# Patient Record
Sex: FEMALE | Race: WHITE | NOT HISPANIC OR LATINO
[De-identification: names, ages, dates, MRNs, and addresses within clinical notes are randomized per-mention and may not be internally consistent; named-entity substitution may affect disease eponyms.]

---

## 2017-03-07 ENCOUNTER — MEDICATION RENEWAL (OUTPATIENT)
Age: 72
End: 2017-03-07

## 2017-05-01 ENCOUNTER — APPOINTMENT (OUTPATIENT)
Dept: PHYSICAL MEDICINE AND REHAB | Facility: CLINIC | Age: 72
End: 2017-05-01

## 2017-05-01 ENCOUNTER — OUTPATIENT (OUTPATIENT)
Dept: OUTPATIENT SERVICES | Facility: HOSPITAL | Age: 72
LOS: 1 days | End: 2017-05-01
Payer: COMMERCIAL

## 2017-05-01 DIAGNOSIS — R26.89 OTHER ABNORMALITIES OF GAIT AND MOBILITY: ICD-10-CM

## 2017-05-01 DIAGNOSIS — G83.4 CAUDA EQUINA SYNDROME: ICD-10-CM

## 2017-05-01 DIAGNOSIS — Z98.89 OTHER SPECIFIED POSTPROCEDURAL STATES: Chronic | ICD-10-CM

## 2017-05-01 DIAGNOSIS — M47.9 SPONDYLOSIS, UNSPECIFIED: Chronic | ICD-10-CM

## 2017-05-01 DIAGNOSIS — T85.118A: Chronic | ICD-10-CM

## 2017-05-01 DIAGNOSIS — K38.9 DISEASE OF APPENDIX, UNSPECIFIED: Chronic | ICD-10-CM

## 2017-05-01 DIAGNOSIS — M25.569 PAIN IN UNSPECIFIED KNEE: Chronic | ICD-10-CM

## 2017-05-01 DIAGNOSIS — Z51.89 ENCOUNTER FOR OTHER SPECIFIED AFTERCARE: ICD-10-CM

## 2017-05-01 DIAGNOSIS — M25.561 PAIN IN RIGHT KNEE: Chronic | ICD-10-CM

## 2017-05-01 DIAGNOSIS — N83.20 UNSPECIFIED OVARIAN CYSTS: Chronic | ICD-10-CM

## 2017-05-01 RX ORDER — AZITHROMYCIN 250 MG/1
250 TABLET, FILM COATED ORAL
Qty: 6 | Refills: 0 | Status: COMPLETED | COMMUNITY
Start: 2017-01-04

## 2017-06-19 ENCOUNTER — MEDICATION RENEWAL (OUTPATIENT)
Age: 72
End: 2017-06-19

## 2017-06-30 ENCOUNTER — APPOINTMENT (OUTPATIENT)
Dept: ORTHOPEDIC SURGERY | Facility: CLINIC | Age: 72
End: 2017-06-30

## 2017-07-03 ENCOUNTER — APPOINTMENT (OUTPATIENT)
Dept: ORTHOPEDIC SURGERY | Facility: CLINIC | Age: 72
End: 2017-07-03

## 2017-07-03 VITALS
DIASTOLIC BLOOD PRESSURE: 79 MMHG | SYSTOLIC BLOOD PRESSURE: 128 MMHG | HEIGHT: 65 IN | HEART RATE: 107 BPM | BODY MASS INDEX: 24.99 KG/M2 | WEIGHT: 150 LBS

## 2017-07-03 DIAGNOSIS — M65.342 TRIGGER FINGER, LEFT RING FINGER: ICD-10-CM

## 2017-07-03 DIAGNOSIS — M65.332 TRIGGER FINGER, LEFT MIDDLE FINGER: ICD-10-CM

## 2017-07-05 ENCOUNTER — APPOINTMENT (OUTPATIENT)
Dept: VASCULAR SURGERY | Facility: CLINIC | Age: 72
End: 2017-07-05

## 2017-07-05 VITALS
SYSTOLIC BLOOD PRESSURE: 174 MMHG | HEART RATE: 98 BPM | RESPIRATION RATE: 15 BRPM | OXYGEN SATURATION: 100 % | WEIGHT: 150 LBS | HEIGHT: 65 IN | TEMPERATURE: 98.1 F | BODY MASS INDEX: 24.99 KG/M2 | DIASTOLIC BLOOD PRESSURE: 78 MMHG

## 2017-07-05 DIAGNOSIS — R09.89 OTHER SPECIFIED SYMPTOMS AND SIGNS INVOLVING THE CIRCULATORY AND RESPIRATORY SYSTEMS: ICD-10-CM

## 2017-08-18 ENCOUNTER — APPOINTMENT (OUTPATIENT)
Dept: PHYSICAL MEDICINE AND REHAB | Facility: CLINIC | Age: 72
End: 2017-08-18
Payer: OTHER MISCELLANEOUS

## 2017-08-18 VITALS
WEIGHT: 150 LBS | DIASTOLIC BLOOD PRESSURE: 89 MMHG | HEIGHT: 65 IN | BODY MASS INDEX: 24.99 KG/M2 | SYSTOLIC BLOOD PRESSURE: 146 MMHG | HEART RATE: 97 BPM | OXYGEN SATURATION: 99 %

## 2017-08-18 PROCEDURE — 99213 OFFICE O/P EST LOW 20 MIN: CPT

## 2017-10-25 PROCEDURE — 97112 NEUROMUSCULAR REEDUCATION: CPT

## 2017-10-25 PROCEDURE — 97116 GAIT TRAINING THERAPY: CPT

## 2017-10-25 PROCEDURE — 97530 THERAPEUTIC ACTIVITIES: CPT

## 2017-10-25 PROCEDURE — 97110 THERAPEUTIC EXERCISES: CPT

## 2017-10-25 PROCEDURE — 97163 PT EVAL HIGH COMPLEX 45 MIN: CPT

## 2017-11-01 ENCOUNTER — OUTPATIENT (OUTPATIENT)
Dept: OUTPATIENT SERVICES | Facility: HOSPITAL | Age: 72
LOS: 1 days | End: 2017-11-01
Payer: COMMERCIAL

## 2017-11-01 DIAGNOSIS — Z98.89 OTHER SPECIFIED POSTPROCEDURAL STATES: Chronic | ICD-10-CM

## 2017-11-01 DIAGNOSIS — M47.9 SPONDYLOSIS, UNSPECIFIED: Chronic | ICD-10-CM

## 2017-11-01 DIAGNOSIS — K38.9 DISEASE OF APPENDIX, UNSPECIFIED: Chronic | ICD-10-CM

## 2017-11-01 DIAGNOSIS — M25.569 PAIN IN UNSPECIFIED KNEE: Chronic | ICD-10-CM

## 2017-11-01 DIAGNOSIS — M25.561 PAIN IN RIGHT KNEE: Chronic | ICD-10-CM

## 2017-11-01 DIAGNOSIS — T85.118A: Chronic | ICD-10-CM

## 2017-11-01 DIAGNOSIS — N83.20 UNSPECIFIED OVARIAN CYSTS: Chronic | ICD-10-CM

## 2017-11-09 ENCOUNTER — APPOINTMENT (OUTPATIENT)
Dept: PHYSICAL MEDICINE AND REHAB | Facility: CLINIC | Age: 72
End: 2017-11-09
Payer: OTHER MISCELLANEOUS

## 2017-11-09 VITALS
SYSTOLIC BLOOD PRESSURE: 140 MMHG | WEIGHT: 150 LBS | HEIGHT: 65 IN | OXYGEN SATURATION: 100 % | DIASTOLIC BLOOD PRESSURE: 83 MMHG | BODY MASS INDEX: 24.99 KG/M2 | HEART RATE: 100 BPM

## 2017-11-09 PROCEDURE — 99213 OFFICE O/P EST LOW 20 MIN: CPT

## 2017-11-17 PROCEDURE — 97116 GAIT TRAINING THERAPY: CPT

## 2017-11-17 PROCEDURE — 97112 NEUROMUSCULAR REEDUCATION: CPT

## 2017-11-17 PROCEDURE — 97110 THERAPEUTIC EXERCISES: CPT

## 2017-11-20 ENCOUNTER — MEDICATION RENEWAL (OUTPATIENT)
Age: 72
End: 2017-11-20

## 2017-11-20 ENCOUNTER — RX RENEWAL (OUTPATIENT)
Age: 72
End: 2017-11-20

## 2017-11-24 DIAGNOSIS — G83.4 CAUDA EQUINA SYNDROME: ICD-10-CM

## 2017-11-24 DIAGNOSIS — Z51.89 ENCOUNTER FOR OTHER SPECIFIED AFTERCARE: ICD-10-CM

## 2017-11-24 DIAGNOSIS — R26.89 OTHER ABNORMALITIES OF GAIT AND MOBILITY: ICD-10-CM

## 2018-03-06 ENCOUNTER — MEDICATION RENEWAL (OUTPATIENT)
Age: 73
End: 2018-03-06

## 2018-03-28 ENCOUNTER — APPOINTMENT (OUTPATIENT)
Dept: VASCULAR SURGERY | Facility: CLINIC | Age: 73
End: 2018-03-28

## 2018-05-01 ENCOUNTER — TRANSCRIPTION ENCOUNTER (OUTPATIENT)
Age: 73
End: 2018-05-01

## 2018-05-01 ENCOUNTER — INPATIENT (INPATIENT)
Facility: HOSPITAL | Age: 73
LOS: 3 days | Discharge: HOME | DRG: 481 | End: 2018-05-05
Attending: SURGERY | Admitting: SURGERY
Payer: MEDICARE

## 2018-05-01 VITALS
HEART RATE: 109 BPM | DIASTOLIC BLOOD PRESSURE: 67 MMHG | OXYGEN SATURATION: 97 % | SYSTOLIC BLOOD PRESSURE: 120 MMHG | TEMPERATURE: 99 F | HEIGHT: 65 IN | WEIGHT: 139.99 LBS | RESPIRATION RATE: 20 BRPM

## 2018-05-01 DIAGNOSIS — M25.569 PAIN IN UNSPECIFIED KNEE: Chronic | ICD-10-CM

## 2018-05-01 DIAGNOSIS — M25.561 PAIN IN RIGHT KNEE: Chronic | ICD-10-CM

## 2018-05-01 DIAGNOSIS — M47.9 SPONDYLOSIS, UNSPECIFIED: Chronic | ICD-10-CM

## 2018-05-01 DIAGNOSIS — Z98.89 OTHER SPECIFIED POSTPROCEDURAL STATES: Chronic | ICD-10-CM

## 2018-05-01 DIAGNOSIS — T85.118A: Chronic | ICD-10-CM

## 2018-05-01 DIAGNOSIS — N83.20 UNSPECIFIED OVARIAN CYSTS: Chronic | ICD-10-CM

## 2018-05-01 DIAGNOSIS — S72.91XA UNSPECIFIED FRACTURE OF RIGHT FEMUR, INITIAL ENCOUNTER FOR CLOSED FRACTURE: ICD-10-CM

## 2018-05-01 DIAGNOSIS — K38.9 DISEASE OF APPENDIX, UNSPECIFIED: Chronic | ICD-10-CM

## 2018-05-01 LAB
ALBUMIN SERPL ELPH-MCNC: 2.7 G/DL — LOW (ref 3.3–5.2)
ALP SERPL-CCNC: 87 U/L — SIGNIFICANT CHANGE UP (ref 40–120)
ALT FLD-CCNC: 20 U/L — SIGNIFICANT CHANGE UP
ANION GAP SERPL CALC-SCNC: 13 MMOL/L — SIGNIFICANT CHANGE UP (ref 5–17)
ANISOCYTOSIS BLD QL: SLIGHT — SIGNIFICANT CHANGE UP
AST SERPL-CCNC: 21 U/L — SIGNIFICANT CHANGE UP
BASOPHILS NFR BLD AUTO: 1 % — SIGNIFICANT CHANGE UP (ref 0–2)
BILIRUB SERPL-MCNC: 1.1 MG/DL — SIGNIFICANT CHANGE UP (ref 0.4–2)
BLD GP AB SCN SERPL QL: SIGNIFICANT CHANGE UP
BUN SERPL-MCNC: 19 MG/DL — SIGNIFICANT CHANGE UP (ref 8–20)
CALCIUM SERPL-MCNC: 8 MG/DL — LOW (ref 8.6–10.2)
CHLORIDE SERPL-SCNC: 106 MMOL/L — SIGNIFICANT CHANGE UP (ref 98–107)
CO2 SERPL-SCNC: 21 MMOL/L — LOW (ref 22–29)
CREAT SERPL-MCNC: 0.58 MG/DL — SIGNIFICANT CHANGE UP (ref 0.5–1.3)
EOSINOPHIL NFR BLD AUTO: 2 % — SIGNIFICANT CHANGE UP (ref 0–5)
GLUCOSE SERPL-MCNC: 120 MG/DL — HIGH (ref 70–115)
HCT VFR BLD CALC: 26.3 % — LOW (ref 37–47)
HGB BLD-MCNC: 8.6 G/DL — LOW (ref 12–16)
INR BLD: 1.28 RATIO — HIGH (ref 0.88–1.16)
LYMPHOCYTES # BLD AUTO: 31 % — SIGNIFICANT CHANGE UP (ref 20–55)
MACROCYTES BLD QL: SLIGHT — SIGNIFICANT CHANGE UP
MCHC RBC-ENTMCNC: 29.3 PG — SIGNIFICANT CHANGE UP (ref 27–31)
MCHC RBC-ENTMCNC: 32.7 G/DL — SIGNIFICANT CHANGE UP (ref 32–36)
MCV RBC AUTO: 89.5 FL — SIGNIFICANT CHANGE UP (ref 81–99)
MICROCYTES BLD QL: SLIGHT — SIGNIFICANT CHANGE UP
MONOCYTES NFR BLD AUTO: 8 % — SIGNIFICANT CHANGE UP (ref 3–10)
NEUTROPHILS NFR BLD AUTO: 58 % — SIGNIFICANT CHANGE UP (ref 37–73)
PLAT MORPH BLD: NORMAL — SIGNIFICANT CHANGE UP
PLATELET # BLD AUTO: 357 K/UL — SIGNIFICANT CHANGE UP (ref 150–400)
POLYCHROMASIA BLD QL SMEAR: SLIGHT — SIGNIFICANT CHANGE UP
POTASSIUM SERPL-MCNC: 3.4 MMOL/L — LOW (ref 3.5–5.3)
POTASSIUM SERPL-SCNC: 3.4 MMOL/L — LOW (ref 3.5–5.3)
PROT SERPL-MCNC: 5.2 G/DL — LOW (ref 6.6–8.7)
PROTHROM AB SERPL-ACNC: 14.1 SEC — HIGH (ref 9.8–12.7)
RBC # BLD: 2.94 M/UL — LOW (ref 4.4–5.2)
RBC # FLD: 13.5 % — SIGNIFICANT CHANGE UP (ref 11–15.6)
RBC BLD AUTO: ABNORMAL
SODIUM SERPL-SCNC: 140 MMOL/L — SIGNIFICANT CHANGE UP (ref 135–145)
TYPE + AB SCN PNL BLD: SIGNIFICANT CHANGE UP
WBC # BLD: 19.4 K/UL — HIGH (ref 4.8–10.8)
WBC # FLD AUTO: 19.4 K/UL — HIGH (ref 4.8–10.8)

## 2018-05-01 PROCEDURE — 93010 ELECTROCARDIOGRAM REPORT: CPT

## 2018-05-01 PROCEDURE — 99285 EMERGENCY DEPT VISIT HI MDM: CPT

## 2018-05-01 PROCEDURE — 73552 X-RAY EXAM OF FEMUR 2/>: CPT | Mod: 26,RT

## 2018-05-01 PROCEDURE — 71045 X-RAY EXAM CHEST 1 VIEW: CPT | Mod: 26

## 2018-05-01 PROCEDURE — 73502 X-RAY EXAM HIP UNI 2-3 VIEWS: CPT | Mod: 26,RT

## 2018-05-01 RX ORDER — SODIUM CHLORIDE 9 MG/ML
1000 INJECTION, SOLUTION INTRAVENOUS
Qty: 0 | Refills: 0 | Status: DISCONTINUED | OUTPATIENT
Start: 2018-05-01 | End: 2018-05-02

## 2018-05-01 RX ORDER — SODIUM CHLORIDE 9 MG/ML
3 INJECTION INTRAMUSCULAR; INTRAVENOUS; SUBCUTANEOUS EVERY 8 HOURS
Qty: 0 | Refills: 0 | Status: DISCONTINUED | OUTPATIENT
Start: 2018-05-01 | End: 2018-05-02

## 2018-05-01 RX ORDER — DOCUSATE SODIUM 100 MG
100 CAPSULE ORAL THREE TIMES A DAY
Qty: 0 | Refills: 0 | Status: DISCONTINUED | OUTPATIENT
Start: 2018-05-01 | End: 2018-05-02

## 2018-05-01 RX ORDER — ENOXAPARIN SODIUM 100 MG/ML
30 INJECTION SUBCUTANEOUS EVERY 12 HOURS
Qty: 0 | Refills: 0 | Status: DISCONTINUED | OUTPATIENT
Start: 2018-05-01 | End: 2018-05-02

## 2018-05-01 RX ORDER — SENNA PLUS 8.6 MG/1
2 TABLET ORAL AT BEDTIME
Qty: 0 | Refills: 0 | Status: DISCONTINUED | OUTPATIENT
Start: 2018-05-01 | End: 2018-05-02

## 2018-05-01 RX ORDER — ONDANSETRON 8 MG/1
4 TABLET, FILM COATED ORAL EVERY 6 HOURS
Qty: 0 | Refills: 0 | Status: DISCONTINUED | OUTPATIENT
Start: 2018-05-01 | End: 2018-05-02

## 2018-05-01 RX ORDER — MORPHINE SULFATE 50 MG/1
4 CAPSULE, EXTENDED RELEASE ORAL EVERY 4 HOURS
Qty: 0 | Refills: 0 | Status: DISCONTINUED | OUTPATIENT
Start: 2018-05-01 | End: 2018-05-02

## 2018-05-01 RX ORDER — MORPHINE SULFATE 50 MG/1
2 CAPSULE, EXTENDED RELEASE ORAL EVERY 4 HOURS
Qty: 0 | Refills: 0 | Status: DISCONTINUED | OUTPATIENT
Start: 2018-05-01 | End: 2018-05-02

## 2018-05-01 RX ADMIN — SODIUM CHLORIDE 3 MILLILITER(S): 9 INJECTION INTRAMUSCULAR; INTRAVENOUS; SUBCUTANEOUS at 22:42

## 2018-05-01 NOTE — ED PROVIDER NOTE - PMH
Cauda equina syndrome    CLL (chronic lymphocytic leukemia)    COPD (chronic obstructive pulmonary disease)    Diverticulosis    Epidural hematoma    GERD (gastroesophageal reflux disease)    GERD (gastroesophageal reflux disease)    History of fusion of cervical spine    HLD (hyperlipidemia)    Myelopathy    Neuropathy  Bilateral lower extremities Right greater than Left  Paraplegia

## 2018-05-01 NOTE — ED PROVIDER NOTE - PSH
Appendix disease  status post appendectomy  Benign ovarian cyst    Cervical arthritis  Status post cervical neck fusion  H/O lumbar discectomy    History of laminectomy    Knee meniscus pain  Left knee arthroscopy menisectomy  Malfunction of nerve stimulator lead  neuropathy  Pain in right knee  Right knee Arthroscopy partial menisectomy

## 2018-05-01 NOTE — ED PROVIDER NOTE - CARE PLAN
Principal Discharge DX:	Femur fracture, right Principal Discharge DX:	Closed nondisplaced transverse fracture of shaft of right femur, initial encounter

## 2018-05-01 NOTE — H&P ADULT - HISTORY OF PRESENT ILLNESS
72 y/o F s/p R hi 74 y/o F transfer from [facility] with R hip periprosthetic fracture s/p R hip IMN 4/23/18. Previous to surgery, pt reports waking up 2 days prior and being in extreme pain, was sent to [facility] where workup revealed hip fracture. on POD3, pt discharged home with home PT and 2 days later on 4/28, visiting nurse and PT noticed pt with worsening swelling or RLE, and clicking. Pt currently states swelling is present and stable but denies worsening pain. Pt has cauda equina syndrome 11 years past after neurostimulator implant surgery for chronic lower back pain caused an iatrogenic epidural hematoma. Pt was initially wheel chair bound but prior to recent R hip surgery was able to ambulate with assistance and cane. Pt also was worked up for CLL by PMD referred hematologist/oncologist, workup negative for malignancy, never received any chemo/immuno/radiation therapy.     Airway clear  Breathsounds present b/l  Central and peripheral pulses 2+, RLE/LLE DP 2+  GCS 15, RLE limited ROM and strength 4/5, unchanged from baseline  RLE pitting edema, no hematoma or deformities

## 2018-05-01 NOTE — ED PROVIDER NOTE - PRINCIPAL DIAGNOSIS
Femur fracture, right Closed nondisplaced transverse fracture of shaft of right femur, initial encounter

## 2018-05-01 NOTE — ED PROVIDER NOTE - MUSCULOSKELETAL, MLM
Right leg swelling, mild tenderness of proximal right leg, large area of ecchymosis over medial aspect of right thigh. Spine midline, full ROM of LLE.

## 2018-05-01 NOTE — ED ADULT NURSE NOTE - OBJECTIVE STATEMENT
Pt. transferred from [facility] for complications s/p right femur fracture. Pt. fell in parking lot last week, went to [facility] where surgery was performed, d/c home with visiting PT on Wednesday, awoke monday morning with swelling to entire right extremity, skin is edematous and tight, warm to touch. Pt. has sensation to extremity, able to move toes, pulses present. currently denies any pain, states mild discomfort. Oswaldo Sanchez and Trauma team at bedside to assess pt.

## 2018-05-01 NOTE — H&P ADULT - NSHPLABSRESULTS_GEN_ALL_CORE
CBC Full  -  ( 01 May 2018 22:45 )  WBC Count : 19.4 K/uL  Hemoglobin : 8.6 g/dL  Hematocrit : 26.3 %  Platelet Count - Automated : 357 K/uL  Mean Cell Volume : 89.5 fl  Mean Cell Hemoglobin : 29.3 pg  Mean Cell Hemoglobin Concentration : 32.7 g/dL  Auto Neutrophil # : x  Auto Lymphocyte # : x  Auto Monocyte # : x  Auto Eosinophil # : x  Auto Basophil # : x  Auto Neutrophil % : 58.0 %  Auto Lymphocyte % : 31.0 %  Auto Monocyte % : 8.0 %  Auto Eosinophil % : 2.0 %  Auto Basophil % : 1.0 %    05-01    140  |  106  |  19.0  ----------------------------<  120<H>  3.4<L>   |  21.0<L>  |  0.58    Ca    8.0<L>      01 May 2018 22:45    TPro  5.2<L>  /  Alb  2.7<L>  /  TBili  1.1  /  DBili  x   /  AST  21  /  ALT  20  /  AlkPhos  87  05-01

## 2018-05-01 NOTE — H&P ADULT - NSHPPHYSICALEXAM_GEN_ALL_CORE
NAD, WDWN  Head NCAT, EOMI, pupils 3mm PERRLA  Neck supple, no JVD, NTTP  Chest expansion symmetric  Lungs CTAB  RRR, S1S2nl  Abd soft, ND, NTTP  PElvis stable, NTTP  Back NTTP, no stepoff or deformity  Ext: FROM, SILT b/l UE and LLE, RLE strength 4/5, baseline decreased sensation

## 2018-05-01 NOTE — H&P ADULT - ASSESSMENT
74 y/o F with R hip periprosthetic fracture, nontoxic, HD nl  -Admit to ACS/Trauma  -Plan for definitive orthopedic repair 5/2  -Pain control  -Monitor VS, encourage IS  NPOMN, IVF  -AM labs  -C/W Home medications  -PT/OT/PMR after surgeyr

## 2018-05-01 NOTE — H&P ADULT - ATTENDING COMMENTS
The patient was seen and examined  Details per the resident's H&P  This is a 73-year old woman who is transferred from an OSH for orthopedic care  The transfer was at the patient's request    Exam:  L LE is N/V intact    Impression:  Left periprosthestic hip fracture    Plan:  Admit  Orthopedic surgery consult  DVT prophylaxis

## 2018-05-01 NOTE — ED PROVIDER NOTE - OBJECTIVE STATEMENT
74 y/o female with PMHx chronic lymphocytic leukemia, COPD, diverticulosis, HLD, epidural hematoma, paraplegia, and PSHx appendectomy presents to the ED for evaluation of femur fracture. Pt was seen at [facility] on April 23 secondary to right hip pain, pt found to have right hip fracture, hip replacement preformed, pt d/c 3 days later. Pt was doing out patient therapy, when she began having pain in the right thigh associated with bruising, pt physical therapist suggested pt be evaluated.  Pt had hip surgery at [facility] last week, pt was d/c and diagnosed with femur fx. 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 Pt denies fever, chills, and any other acute symptoms and complaints at this time.

## 2018-05-01 NOTE — H&P ADULT - PMH
Cauda equina syndrome    CLL (chronic lymphocytic leukemia)  Worked up as Negative outpatient  COPD (chronic obstructive pulmonary disease)    Diverticulosis    Epidural hematoma    GERD (gastroesophageal reflux disease)    GERD (gastroesophageal reflux disease)    History of fusion of cervical spine    HLD (hyperlipidemia)    Myelopathy    Neuropathy  Bilateral lower extremities Right greater than Left  Paraplegia

## 2018-05-01 NOTE — CONSULT NOTE ADULT - SUBJECTIVE AND OBJECTIVE BOX
Pt Name: INNA BROOKS    MRN: [id number]      Patient is a 73y Female presenting to the emergency department with a chief complaint of periprosthetic fx   Patient is a 73y old  Female who presents with a chief complaint of periprosthetic fx.  Patient states she had a slip and fall on 4/25 which she was treated for at [facility] with Right total hip arthroplasty on 4/27/18.  patient states was noticing pain and clicking over past week, no trauma noted, 2 days was unable to ambulate.  patient was told today to go to ER by Visiting nurse who performed xray and revealed positive periprosthetic fx.  patient states has some residual weakness to right leg from cauda equina in past. patient had doppler at [facility] for DVT which was negative per pt.  Patient was on ASA for DVTP    HEALTH ISSUES - PROBLEM Dx: right periprosthetic femur fx      .      REVIEW OF SYSTEMS      General:	    Skin/Breast:  	  Ophthalmologic:  	  ENMT:	    Respiratory and Thorax:  	  Cardiovascular:	    Gastrointestinal:	    Genitourinary:	    Musculoskeletal:	 right femur pain     Neurological:	    Psychiatric:	    Hematology/Lymphatics:	    Endocrine:	    Allergic/Immunologic:	    ROS is otherwise negative.    PAST MEDICAL & SURGICAL HISTORY:  PAST MEDICAL & SURGICAL HISTORY:  COPD (chronic obstructive pulmonary disease)  Diverticulosis  Neuropathy: Bilateral lower extremities Right greater than Left  History of fusion of cervical spine  Cauda equina syndrome  CLL (chronic lymphocytic leukemia)  GERD (gastroesophageal reflux disease)  GERD (gastroesophageal reflux disease)  Paraplegia  HLD (hyperlipidemia)  Epidural hematoma  Myelopathy  Malfunction of nerve stimulator lead: neuropathy  Knee meniscus pain: Left knee arthroscopy menisectomy  Pain in right knee: Right knee Arthroscopy partial menisectomy  Benign ovarian cyst  Cervical arthritis: Status post cervical neck fusion  H/O lumbar discectomy  History of laminectomy  Appendix disease: status post appendectomy      Allergies: No Known Allergies      Medications: sodium chloride 0.9% lock flush 3 milliLiter(s) IV Push every 8 hours      FAMILY HISTORY:  No pertinent family history in first degree relatives  : non-contributory    Social History:     Ambulation: Walking independently [ ] With Cane [ ] With Walker [ ]  Bedbound [ ]               PHYSICAL EXAM:    Vital Signs Last 24 Hrs  T(C): 37.2 (01 May 2018 21:15), Max: 37.2 (01 May 2018 21:15)  T(F): 99 (01 May 2018 21:15), Max: 99 (01 May 2018 21:15)  HR: 109 (01 May 2018 21:15) (109 - 109)  BP: 120/67 (01 May 2018 21:15) (120/67 - 120/67)  BP(mean): --  RR: 20 (01 May 2018 21:15) (20 - 20)  SpO2: 97% (01 May 2018 21:15) (97% - 97%)  Daily Height in cm: 165.1 (01 May 2018 21:15)    Daily     Appearance: Alert, responsive, in no acute distress.    Neurological: Sensation is grossly intact to light touch. 5/5 motor function of all extremities. No focal deficits or weaknesses found.    Skin: no rash on visible skin. Skin is clean, dry and intact. No bleeding. No abrasions. No ulcerations.    Vascular: 2+ distal pulses. Cap refill < 2 sec. No signs of venous insuffiency or stasis. No extremity ulcerations. No cyanosis.    Musculoskeletal:         Right Lower Extremity: positive bruising to right mid thigh, positive tenderness to mid thigh, pulse intact, hip and knee held in flexion due to pain, Dorsi/plantar flexion intact     Imaging Studies: right hip xray     A/P:  Pt is a 73y Female who presents with a chief complaint of right leg pain found to have periprosthetic femur fx     PLAN:   * NPO for OR tomorrow  * IV fluids ordered and to start once NPO  * Medical clearance requested for procedure  * Bed rest Pt Name: INNA BROOKS    MRN: [id number]      Patient is a 73y Female presenting to the emergency department with a chief complaint of periprosthetic fx   Patient is a 73y old  Female who presents with a chief complaint of periprosthetic fx.  Patient states she had a slip and fall on 4/25 which she was treated for at [facility][1] with Right total hip arthroplasty on 4/27/18.  patient states was noticing pain and clicking over past week, no trauma noted, 2 days was unable to ambulate.  patient was told today to go to ER by Visiting nurse who performed xray and revealed positive periprosthetic fx.  patient states has some residual weakness to right leg from cauda equina in past. patient had doppler at [facility][1] for DVT which was negative per pt.  Patient was on ASA for DVTP    HEALTH ISSUES - PROBLEM Dx: right periprosthetic femur fx      .      REVIEW OF SYSTEMS      General:	    Skin/Breast:  	  Ophthalmologic:  	  ENMT:	    Respiratory and Thorax:  	  Cardiovascular:	    Gastrointestinal:	    Genitourinary:	    Musculoskeletal:	 right femur pain     Neurological:	    Psychiatric:	    Hematology/Lymphatics:	    Endocrine:	    Allergic/Immunologic:	    ROS is otherwise negative.    PAST MEDICAL & SURGICAL HISTORY:  PAST MEDICAL & SURGICAL HISTORY:  COPD (chronic obstructive pulmonary disease)  Diverticulosis  Neuropathy: Bilateral lower extremities Right greater than Left  History of fusion of cervical spine  Cauda equina syndrome  CLL (chronic lymphocytic leukemia)  GERD (gastroesophageal reflux disease)  GERD (gastroesophageal reflux disease)  Paraplegia  HLD (hyperlipidemia)  Epidural hematoma  Myelopathy  Malfunction of nerve stimulator lead: neuropathy  Knee meniscus pain: Left knee arthroscopy menisectomy  Pain in right knee: Right knee Arthroscopy partial menisectomy  Benign ovarian cyst  Cervical arthritis: Status post cervical neck fusion  H/O lumbar discectomy  History of laminectomy  Appendix disease: status post appendectomy      Allergies: No Known Allergies      Medications: sodium chloride 0.9% lock flush 3 milliLiter(s) IV Push every 8 hours      FAMILY HISTORY:  No pertinent family history in first degree relatives  : non-contributory    Social History:     Ambulation: Walking independently [ ] With Cane [ ] With Walker [ ]  Bedbound [ ]               PHYSICAL EXAM:    Vital Signs Last 24 Hrs  T(C): 37.2 (01 May 2018 21:15), Max: 37.2 (01 May 2018 21:15)  T(F): 99 (01 May 2018 21:15), Max: 99 (01 May 2018 21:15)  HR: 109 (01 May 2018 21:15) (109 - 109)  BP: 120/67 (01 May 2018 21:15) (120/67 - 120/67)  BP(mean): --  RR: 20 (01 May 2018 21:15) (20 - 20)  SpO2: 97% (01 May 2018 21:15) (97% - 97%)  Daily Height in cm: 165.1 (01 May 2018 21:15)    Daily     Appearance: Alert, responsive, in no acute distress.    Neurological: Sensation is grossly intact to light touch. 5/5 motor function of all extremities. No focal deficits or weaknesses found.    Skin: no rash on visible skin. Skin is clean, dry and intact. No bleeding. No abrasions. No ulcerations.    Vascular: 2+ distal pulses. Cap refill < 2 sec. No signs of venous insuffiency or stasis. No extremity ulcerations. No cyanosis.    Musculoskeletal:         Right Lower Extremity: positive bruising to right mid thigh, positive tenderness to mid thigh, pulse intact, hip and knee held in flexion due to pain, Dorsi/plantar flexion intact     Imaging Studies: right hip xray     Case discussed with Dr. Cast    A/P:  Pt is a 73y Female who presents with a chief complaint of right leg pain found to have periprosthetic femur fx     PLAN:   * NPO for OR tomorrow  * IV fluids ordered and to start once NPO  * Medical clearance requested for procedure  * Bed rest Pt Name: INNA BROOKS    MRN: [id number]      Patient is a 73y Female presenting to the emergency department with a chief complaint of periprosthetic fx   Patient is a 73y old  Female who presents with a chief complaint of periprosthetic fx.  Patient states she had a slip and fall on 4/25 which she was treated for at [facility] with Right hip IM Nail on 4/27/18.  patient states was noticing pain and clicking over past week, no trauma noted, 2 days was unable to ambulate.  patient was told today to go to ER by Visiting nurse who performed xray and revealed positive periprosthetic fx.  patient states has some residual weakness to right leg from cauda equina in past. patient had doppler at [facility] for DVT which was negative per pt.  Patient was on ASA for DVTP    HEALTH ISSUES - PROBLEM Dx: right periprosthetic femur fx      .      REVIEW OF SYSTEMS      General:	    Skin/Breast:  	  Ophthalmologic:  	  ENMT:	    Respiratory and Thorax:  	  Cardiovascular:	    Gastrointestinal:	    Genitourinary:	    Musculoskeletal:	 right femur pain     Neurological:	    Psychiatric:	    Hematology/Lymphatics:	    Endocrine:	    Allergic/Immunologic:	    ROS is otherwise negative.    PAST MEDICAL & SURGICAL HISTORY:  PAST MEDICAL & SURGICAL HISTORY:  COPD (chronic obstructive pulmonary disease)  Diverticulosis  Neuropathy: Bilateral lower extremities Right greater than Left  History of fusion of cervical spine  Cauda equina syndrome  CLL (chronic lymphocytic leukemia)  GERD (gastroesophageal reflux disease)  GERD (gastroesophageal reflux disease)  Paraplegia  HLD (hyperlipidemia)  Epidural hematoma  Myelopathy  Malfunction of nerve stimulator lead: neuropathy  Knee meniscus pain: Left knee arthroscopy menisectomy  Pain in right knee: Right knee Arthroscopy partial menisectomy  Benign ovarian cyst  Cervical arthritis: Status post cervical neck fusion  H/O lumbar discectomy  History of laminectomy  Appendix disease: status post appendectomy      Allergies: No Known Allergies      Medications: sodium chloride 0.9% lock flush 3 milliLiter(s) IV Push every 8 hours      FAMILY HISTORY:  No pertinent family history in first degree relatives  : non-contributory    Social History: no drug use    Ambulation: Walking independently            PHYSICAL EXAM:    Vital Signs Last 24 Hrs  T(C): 37.2 (01 May 2018 21:15), Max: 37.2 (01 May 2018 21:15)  T(F): 99 (01 May 2018 21:15), Max: 99 (01 May 2018 21:15)  HR: 109 (01 May 2018 21:15) (109 - 109)  BP: 120/67 (01 May 2018 21:15) (120/67 - 120/67)  BP(mean): --  RR: 20 (01 May 2018 21:15) (20 - 20)  SpO2: 97% (01 May 2018 21:15) (97% - 97%)  Daily Height in cm: 165.1 (01 May 2018 21:15)    Daily     Appearance: Alert, responsive, in no acute distress.    Neurological: Sensation is grossly intact to light touch. 5/5 motor function of all extremities. No focal deficits or weaknesses found.    Skin: no rash on visible skin. Skin is clean, dry and intact. No bleeding. No abrasions. No ulcerations.    Vascular: 2+ distal pulses. Cap refill < 2 sec. No signs of venous insuffiency or stasis. No extremity ulcerations. No cyanosis.    Musculoskeletal:         Right Lower Extremity: positive bruising to right mid thigh, positive tenderness to mid thigh, pulse intact, hip and knee held in flexion due to pain, Dorsi/plantar flexion intact     Imaging Studies: right hip/femur xray positive periprosthetic femur fx distal to TFN     Case discussed with Dr. Cast    A/P:  Pt is a 73y Female who presents with a chief complaint of right leg pain found to have periprosthetic femur fx     PLAN:   * NPO for OR tomorrow  * IV fluids ordered and to start once NPO  * Medical clearance requested for procedure  * Bed rest Pt Name: INNA BROOKS    MRN: [id number]      Patient is a 73y Female presenting to the emergency department with a chief complaint of periprosthetic fx   Patient is a 73y old  Female who presents with a chief complaint of periprosthetic fx.  Patient states she had a slip and fall on 4/21 which she was treated for at [facility] with Right hip IM Nail on 4/23/18.  patient states was noticing pain and clicking over past week, no trauma noted, 2 days was unable to ambulate.  patient was told today to go to ER by Visiting nurse who performed xray and revealed positive periprosthetic fx.  patient states has some residual weakness to right leg from cauda equina in past. patient had doppler at [facility] for DVT which was negative per pt.  Patient was on ASA for DVTP    HEALTH ISSUES - PROBLEM Dx: right periprosthetic femur fx      .      REVIEW OF SYSTEMS      General:	    Skin/Breast:  	  Ophthalmologic:  	  ENMT:	    Respiratory and Thorax:  	  Cardiovascular:	    Gastrointestinal:	    Genitourinary:	    Musculoskeletal:	 right femur pain     Neurological:	    Psychiatric:	    Hematology/Lymphatics:	    Endocrine:	    Allergic/Immunologic:	    ROS is otherwise negative.    PAST MEDICAL & SURGICAL HISTORY:  PAST MEDICAL & SURGICAL HISTORY:  COPD (chronic obstructive pulmonary disease)  Diverticulosis  Neuropathy: Bilateral lower extremities Right greater than Left  History of fusion of cervical spine  Cauda equina syndrome  CLL (chronic lymphocytic leukemia)  GERD (gastroesophageal reflux disease)  GERD (gastroesophageal reflux disease)  Paraplegia  HLD (hyperlipidemia)  Epidural hematoma  Myelopathy  Malfunction of nerve stimulator lead: neuropathy  Knee meniscus pain: Left knee arthroscopy menisectomy  Pain in right knee: Right knee Arthroscopy partial menisectomy  Benign ovarian cyst  Cervical arthritis: Status post cervical neck fusion  H/O lumbar discectomy  History of laminectomy  Appendix disease: status post appendectomy      Allergies: No Known Allergies      Medications: sodium chloride 0.9% lock flush 3 milliLiter(s) IV Push every 8 hours      FAMILY HISTORY:  No pertinent family history in first degree relatives  : non-contributory    Social History: no drug use    Ambulation: Walking independently            PHYSICAL EXAM:    Vital Signs Last 24 Hrs  T(C): 37.2 (01 May 2018 21:15), Max: 37.2 (01 May 2018 21:15)  T(F): 99 (01 May 2018 21:15), Max: 99 (01 May 2018 21:15)  HR: 109 (01 May 2018 21:15) (109 - 109)  BP: 120/67 (01 May 2018 21:15) (120/67 - 120/67)  BP(mean): --  RR: 20 (01 May 2018 21:15) (20 - 20)  SpO2: 97% (01 May 2018 21:15) (97% - 97%)  Daily Height in cm: 165.1 (01 May 2018 21:15)    Daily     Appearance: Alert, responsive, in no acute distress.    Neurological: Sensation is grossly intact to light touch. 5/5 motor function of all extremities. No focal deficits or weaknesses found.    Skin: no rash on visible skin. Skin is clean, dry and intact. No bleeding. No abrasions. No ulcerations.    Vascular: 2+ distal pulses. Cap refill < 2 sec. No signs of venous insuffiency or stasis. No extremity ulcerations. No cyanosis.    Musculoskeletal:         Right Lower Extremity: positive bruising to right mid thigh, positive tenderness to mid thigh, pulse intact, hip and knee held in flexion due to pain, Dorsi/plantar flexion intact     Imaging Studies: right hip/femur xray positive periprosthetic femur fx distal to TFN     Case discussed with Dr. Cast    A/P:  Pt is a 73y Female who presents with a chief complaint of right leg pain found to have periprosthetic femur fx     PLAN:   * NPO for OR tomorrow  * IV fluids ordered and to start once NPO  * Medical clearance requested for procedure  * Bed rest Pt Name: INNA BROOKS    MRN: [id number]      Patient is a 73y Female presenting to the emergency department with a chief complaint of periprosthetic fx   Patient is a 73y old  Female who presents with a chief complaint of periprosthetic fx.  Patient states she had a slip and fall on 4/21 which she was treated for at [facility] with Right hip IM Nail on 4/23/18.  patient states was noticing pain and clicking over past week, no trauma noted, 2 days was unable to ambulate.  patient was told today to go to ER by Visiting nurse who performed xray and revealed positive periprosthetic fx.  patient states has some residual weakness to right leg from cauda equina in past. patient had doppler at [facility] for DVT which was negative per pt.  Patient was on ASA for DVTP    HEALTH ISSUES - PROBLEM Dx: right periprosthetic femur fx      .      REVIEW OF SYSTEMS      General:	    Skin/Breast:  	  Ophthalmologic:  	  ENMT:	    Respiratory and Thorax:  	  Cardiovascular:	    Gastrointestinal:	    Genitourinary:	    Musculoskeletal:	 right femur pain     Neurological:	    Psychiatric:	    Hematology/Lymphatics:	    Endocrine:	    Allergic/Immunologic:	    ROS is otherwise negative.    PAST MEDICAL & SURGICAL HISTORY:  PAST MEDICAL & SURGICAL HISTORY:  COPD (chronic obstructive pulmonary disease)  Diverticulosis  Neuropathy: Bilateral lower extremities Right greater than Left  History of fusion of cervical spine  Cauda equina syndrome  CLL (chronic lymphocytic leukemia)  GERD (gastroesophageal reflux disease)  GERD (gastroesophageal reflux disease)  Paraplegia  HLD (hyperlipidemia)  Epidural hematoma  Myelopathy  Malfunction of nerve stimulator lead: neuropathy  Knee meniscus pain: Left knee arthroscopy menisectomy  Pain in right knee: Right knee Arthroscopy partial menisectomy  Benign ovarian cyst  Cervical arthritis: Status post cervical neck fusion  H/O lumbar discectomy  History of laminectomy  Appendix disease: status post appendectomy      Allergies: No Known Allergies      Medications: sodium chloride 0.9% lock flush 3 milliLiter(s) IV Push every 8 hours      FAMILY HISTORY:  No pertinent family history in first degree relatives  : non-contributory    Social History: no drug use    Ambulation: Walking independently            PHYSICAL EXAM:    Vital Signs Last 24 Hrs  T(C): 37.2 (01 May 2018 21:15), Max: 37.2 (01 May 2018 21:15)  T(F): 99 (01 May 2018 21:15), Max: 99 (01 May 2018 21:15)  HR: 109 (01 May 2018 21:15) (109 - 109)  BP: 120/67 (01 May 2018 21:15) (120/67 - 120/67)  BP(mean): --  RR: 20 (01 May 2018 21:15) (20 - 20)  SpO2: 97% (01 May 2018 21:15) (97% - 97%)  Daily Height in cm: 165.1 (01 May 2018 21:15)    Daily     Appearance: Alert, responsive, in no acute distress.    Neurological: Sensation is grossly intact to light touch. 5/5 motor function of all extremities. No focal deficits or weaknesses found.    Skin: no rash on visible skin. Skin is clean, dry and intact. No bleeding. No abrasions. No ulcerations.    Vascular: 2+ distal pulses. Cap refill < 2 sec. No signs of venous insuffiency or stasis. No extremity ulcerations. No cyanosis.    Musculoskeletal:         Right Lower Extremity: positive bruising to right mid thigh, positive tenderness to mid thigh, pulse intact, hip and knee held in flexion due to pain, Dorsi/plantar flexion intact with weakness, 2 well healing incisions with staples in place, no discharge, no erythema, no warmth    Imaging Studies: right hip/femur xray positive periprosthetic femur fx distal to TFN     Case discussed with Dr. Cast    A/P:  Pt is a 73y Female who presents with a chief complaint of right leg pain found to have periprosthetic femur fx     PLAN:   * NPO for OR tomorrow  * IV fluids ordered and to start once NPO  * Medical clearance requested for procedure  * Bed rest Pt Name: INNA BROOKS    MRN: [id number]      Patient is a 73y Female presenting to the emergency department with a chief complaint of periprosthetic fx   Patient is a 73y old  Female who presents with a chief complaint of periprosthetic fx.  Patient states she had a slip and fall on 4/21 which she was treated for at [facility] with Right hip IM Nail on 4/23/18.  patient states was noticing pain and clicking over past week, no trauma noted, 2 days was unable to ambulate.  patient was told today to go to ER by Visiting nurse who performed xray and revealed positive periprosthetic fx.  patient states has some residual weakness to right leg from cauda equina in past. patient had doppler at [facility] for DVT which was negative per pt.  Patient was on ASA for DVTP    HEALTH ISSUES - PROBLEM Dx: right periprosthetic femur fx      .      REVIEW OF SYSTEMS      General:	    Skin/Breast:  	  Ophthalmologic:  	  ENMT:	    Respiratory and Thorax:  	  Cardiovascular:	    Gastrointestinal:	    Genitourinary:	    Musculoskeletal:	 right femur pain     Neurological:	    Psychiatric:	    Hematology/Lymphatics:	    Endocrine:	    Allergic/Immunologic:	    ROS is otherwise negative.    PAST MEDICAL & SURGICAL HISTORY:  PAST MEDICAL & SURGICAL HISTORY:  COPD (chronic obstructive pulmonary disease)  Diverticulosis  Neuropathy: Bilateral lower extremities Right greater than Left  History of fusion of cervical spine  Cauda equina syndrome  CLL (chronic lymphocytic leukemia)  GERD (gastroesophageal reflux disease)  GERD (gastroesophageal reflux disease)  Paraplegia  HLD (hyperlipidemia)  Epidural hematoma  Myelopathy  Malfunction of nerve stimulator lead: neuropathy  Knee meniscus pain: Left knee arthroscopy menisectomy  Pain in right knee: Right knee Arthroscopy partial menisectomy  Benign ovarian cyst  Cervical arthritis: Status post cervical neck fusion  H/O lumbar discectomy  History of laminectomy  Appendix disease: status post appendectomy      Allergies: No Known Allergies      Medications: sodium chloride 0.9% lock flush 3 milliLiter(s) IV Push every 8 hours      FAMILY HISTORY:  No pertinent family history in first degree relatives  : non-contributory    Social History: no drug use    Ambulation: Walking independently with cane            PHYSICAL EXAM:    Vital Signs Last 24 Hrs  T(C): 37.2 (01 May 2018 21:15), Max: 37.2 (01 May 2018 21:15)  T(F): 99 (01 May 2018 21:15), Max: 99 (01 May 2018 21:15)  HR: 109 (01 May 2018 21:15) (109 - 109)  BP: 120/67 (01 May 2018 21:15) (120/67 - 120/67)  BP(mean): --  RR: 20 (01 May 2018 21:15) (20 - 20)  SpO2: 97% (01 May 2018 21:15) (97% - 97%)  Daily Height in cm: 165.1 (01 May 2018 21:15)    Daily     Appearance: Alert, responsive, in no acute distress.    Neurological: Sensation is grossly intact to light touch. 5/5 motor function of all extremities. No focal deficits or weaknesses found.    Skin: no rash on visible skin. Skin is clean, dry and intact. No bleeding. No abrasions. No ulcerations.    Vascular: 2+ distal pulses. Cap refill < 2 sec. No signs of venous insuffiency or stasis. No extremity ulcerations. No cyanosis.    Musculoskeletal:         Right Lower Extremity: positive bruising to right mid thigh, positive tenderness to mid thigh, pulse intact, hip and knee held in flexion due to pain, Dorsi/plantar flexion intact with weakness, 2 well healing incisions with staples in place, no discharge, no erythema, no warmth    Imaging Studies: right hip/femur xray positive periprosthetic femur fx distal to TFN     Case discussed with Dr. Cast    A/P:  Pt is a 73y Female who presents with a chief complaint of right leg pain found to have periprosthetic femur fx     PLAN:   * NPO for OR tomorrow  * IV fluids ordered and to start once NPO  * Medical clearance requested for procedure  * Bed rest
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
## 2018-05-02 LAB
ANION GAP SERPL CALC-SCNC: 10 MMOL/L — SIGNIFICANT CHANGE UP (ref 5–17)
BASOPHILS # BLD AUTO: 0 K/UL — SIGNIFICANT CHANGE UP (ref 0–0.2)
BASOPHILS NFR BLD AUTO: 0.1 % — SIGNIFICANT CHANGE UP (ref 0–2)
BUN SERPL-MCNC: 14 MG/DL — SIGNIFICANT CHANGE UP (ref 8–20)
CALCIUM SERPL-MCNC: 8.2 MG/DL — LOW (ref 8.6–10.2)
CHLORIDE SERPL-SCNC: 108 MMOL/L — HIGH (ref 98–107)
CO2 SERPL-SCNC: 23 MMOL/L — SIGNIFICANT CHANGE UP (ref 22–29)
CREAT SERPL-MCNC: 0.51 MG/DL — SIGNIFICANT CHANGE UP (ref 0.5–1.3)
EOSINOPHIL # BLD AUTO: 0.3 K/UL — SIGNIFICANT CHANGE UP (ref 0–0.5)
EOSINOPHIL NFR BLD AUTO: 2 % — SIGNIFICANT CHANGE UP (ref 0–6)
GLUCOSE SERPL-MCNC: 109 MG/DL — SIGNIFICANT CHANGE UP (ref 70–115)
HCT VFR BLD CALC: 25.2 % — LOW (ref 37–47)
HGB BLD-MCNC: 8.1 G/DL — LOW (ref 12–16)
LYMPHOCYTES # BLD AUTO: 32.8 % — SIGNIFICANT CHANGE UP (ref 20–55)
LYMPHOCYTES # BLD AUTO: 4.5 K/UL — SIGNIFICANT CHANGE UP (ref 1–4.8)
MAGNESIUM SERPL-MCNC: 1.9 MG/DL — SIGNIFICANT CHANGE UP (ref 1.6–2.6)
MCHC RBC-ENTMCNC: 28.6 PG — SIGNIFICANT CHANGE UP (ref 27–31)
MCHC RBC-ENTMCNC: 32.1 G/DL — SIGNIFICANT CHANGE UP (ref 32–36)
MCV RBC AUTO: 89 FL — SIGNIFICANT CHANGE UP (ref 81–99)
MONOCYTES # BLD AUTO: 1.4 K/UL — HIGH (ref 0–0.8)
MONOCYTES NFR BLD AUTO: 9.9 % — SIGNIFICANT CHANGE UP (ref 3–10)
NEUTROPHILS # BLD AUTO: 7.4 K/UL — SIGNIFICANT CHANGE UP (ref 1.8–8)
NEUTROPHILS NFR BLD AUTO: 54.7 % — SIGNIFICANT CHANGE UP (ref 37–73)
PHOSPHATE SERPL-MCNC: 2.8 MG/DL — SIGNIFICANT CHANGE UP (ref 2.4–4.7)
PLATELET # BLD AUTO: 322 K/UL — SIGNIFICANT CHANGE UP (ref 150–400)
POTASSIUM SERPL-MCNC: 4 MMOL/L — SIGNIFICANT CHANGE UP (ref 3.5–5.3)
POTASSIUM SERPL-SCNC: 4 MMOL/L — SIGNIFICANT CHANGE UP (ref 3.5–5.3)
RBC # BLD: 2.83 M/UL — LOW (ref 4.4–5.2)
RBC # FLD: 14 % — SIGNIFICANT CHANGE UP (ref 11–15.6)
SODIUM SERPL-SCNC: 141 MMOL/L — SIGNIFICANT CHANGE UP (ref 135–145)
WBC # BLD: 13.6 K/UL — HIGH (ref 4.8–10.8)
WBC # FLD AUTO: 13.6 K/UL — HIGH (ref 4.8–10.8)

## 2018-05-02 PROCEDURE — 97605 NEG PRS WND THER DME<=50SQCM: CPT | Mod: 59

## 2018-05-02 PROCEDURE — 73552 X-RAY EXAM OF FEMUR 2/>: CPT | Mod: 26,RT

## 2018-05-02 PROCEDURE — 76001: CPT | Mod: 26,59

## 2018-05-02 PROCEDURE — 99223 1ST HOSP IP/OBS HIGH 75: CPT | Mod: 57

## 2018-05-02 PROCEDURE — 27507 TREATMENT OF THIGH FRACTURE: CPT | Mod: RT

## 2018-05-02 PROCEDURE — 27245 TREAT THIGH FRACTURE: CPT | Mod: 55,59,RT

## 2018-05-02 PROCEDURE — 27507 TREATMENT OF THIGH FRACTURE: CPT | Mod: AS,RT

## 2018-05-02 PROCEDURE — 27245 TREAT THIGH FRACTURE: CPT | Mod: AS,55,59,RT

## 2018-05-02 RX ORDER — ENOXAPARIN SODIUM 100 MG/ML
30 INJECTION SUBCUTANEOUS EVERY 12 HOURS
Qty: 0 | Refills: 0 | Status: DISCONTINUED | OUTPATIENT
Start: 2018-05-02 | End: 2018-05-05

## 2018-05-02 RX ORDER — NIACIN 50 MG
500 TABLET ORAL AT BEDTIME
Qty: 0 | Refills: 0 | Status: DISCONTINUED | OUTPATIENT
Start: 2018-05-02 | End: 2018-05-02

## 2018-05-02 RX ORDER — POTASSIUM CHLORIDE 20 MEQ
10 PACKET (EA) ORAL ONCE
Qty: 0 | Refills: 0 | Status: COMPLETED | OUTPATIENT
Start: 2018-05-02 | End: 2018-05-02

## 2018-05-02 RX ORDER — SIMVASTATIN 20 MG/1
20 TABLET, FILM COATED ORAL AT BEDTIME
Qty: 0 | Refills: 0 | Status: DISCONTINUED | OUTPATIENT
Start: 2018-05-02 | End: 2018-05-02

## 2018-05-02 RX ORDER — HYDROMORPHONE HYDROCHLORIDE 2 MG/ML
0.5 INJECTION INTRAMUSCULAR; INTRAVENOUS; SUBCUTANEOUS EVERY 4 HOURS
Qty: 0 | Refills: 0 | Status: DISCONTINUED | OUTPATIENT
Start: 2018-05-02 | End: 2018-05-02

## 2018-05-02 RX ORDER — SODIUM CHLORIDE 9 MG/ML
1000 INJECTION, SOLUTION INTRAVENOUS
Qty: 0 | Refills: 0 | Status: DISCONTINUED | OUTPATIENT
Start: 2018-05-02 | End: 2018-05-03

## 2018-05-02 RX ORDER — GABAPENTIN 400 MG/1
300 CAPSULE ORAL DAILY
Qty: 0 | Refills: 0 | Status: DISCONTINUED | OUTPATIENT
Start: 2018-05-02 | End: 2018-05-02

## 2018-05-02 RX ORDER — CEFAZOLIN SODIUM 1 G
2000 VIAL (EA) INJECTION
Qty: 0 | Refills: 0 | Status: COMPLETED | OUTPATIENT
Start: 2018-05-02 | End: 2018-05-03

## 2018-05-02 RX ORDER — BENZOCAINE AND MENTHOL 5; 1 G/100ML; G/100ML
1 LIQUID ORAL EVERY 4 HOURS
Qty: 0 | Refills: 0 | Status: DISCONTINUED | OUTPATIENT
Start: 2018-05-02 | End: 2018-05-05

## 2018-05-02 RX ORDER — FENTANYL CITRATE 50 UG/ML
25 INJECTION INTRAVENOUS
Qty: 0 | Refills: 0 | Status: DISCONTINUED | OUTPATIENT
Start: 2018-05-02 | End: 2018-05-03

## 2018-05-02 RX ORDER — ACETAMINOPHEN 500 MG
650 TABLET ORAL EVERY 6 HOURS
Qty: 0 | Refills: 0 | Status: DISCONTINUED | OUTPATIENT
Start: 2018-05-02 | End: 2018-05-02

## 2018-05-02 RX ORDER — CEFAZOLIN SODIUM 1 G
2000 VIAL (EA) INJECTION
Qty: 0 | Refills: 0 | Status: DISCONTINUED | OUTPATIENT
Start: 2018-05-02 | End: 2018-05-02

## 2018-05-02 RX ORDER — ONDANSETRON 8 MG/1
4 TABLET, FILM COATED ORAL ONCE
Qty: 0 | Refills: 0 | Status: DISCONTINUED | OUTPATIENT
Start: 2018-05-02 | End: 2018-05-03

## 2018-05-02 RX ADMIN — Medication 650 MILLIGRAM(S): at 00:43

## 2018-05-02 RX ADMIN — Medication 650 MILLIGRAM(S): at 01:43

## 2018-05-02 RX ADMIN — Medication 100 MILLIEQUIVALENT(S): at 08:49

## 2018-05-02 RX ADMIN — GABAPENTIN 300 MILLIGRAM(S): 400 CAPSULE ORAL at 12:59

## 2018-05-02 RX ADMIN — Medication 100 MILLIGRAM(S): at 05:30

## 2018-05-02 RX ADMIN — SODIUM CHLORIDE 75 MILLILITER(S): 9 INJECTION, SOLUTION INTRAVENOUS at 00:31

## 2018-05-02 RX ADMIN — SODIUM CHLORIDE 3 MILLILITER(S): 9 INJECTION INTRAMUSCULAR; INTRAVENOUS; SUBCUTANEOUS at 05:28

## 2018-05-02 NOTE — BRIEF OPERATIVE NOTE - COMMENTS
post-op plan: NWB RLE, full ROM RLE, PT/OT, ancef per SCIP, LMWH or equivalent x 4 weeks, f/u ortho ~5/18 for wound check
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
## 2018-05-02 NOTE — BRIEF OPERATIVE NOTE - PROCEDURE
<<-----Click on this checkbox to enter Procedure Femur fracture surgery  05/02/2018  ORIF right periprosthetic femur fracture  Active  MLINN

## 2018-05-02 NOTE — CHART NOTE - NSCHARTNOTEFT_GEN_A_CORE
POST OP CHECK NOTE     Patient seen and examined at bedside in PACU. POD#0 s/p ORIF R periprosthetic femur fx. Reports pain is well controlled. Tolerated sips in PACU with no n/v. Had an episode of incontinence post operatively.     PACU Vital Signs   /98 HR 97 RR 14 O2 91% on RA     I&O's Detail  Intraoperatively - EBL 500cc, IVFs 900 and given 2 units pRBCs intraoperatively.     Constitutional: Patient sleeping comfortably in stretcher  HEENT: EOMI / PERRL b/l  Neck: No JVD, full ROM without pain   Respiratory: CTAB  Cardiovascular: regular rate & rhythm   Gastrointestinal: Abdomen soft, non-tender, non-distended, no rebound tenderness / guarding   Neurological: GCS: 15 (4/5/6)  Psychiatric: Normal mood, normal affect  Musculoskeletal: RLE with provena vac in place, sterile surgical dressing c/d/i, appropriate tenderness to palpation, motor and sensory function intact     MEDICATIONS  (STANDING):  ceFAZolin  Injectable. 2000 milliGRAM(s) IV Push <User Schedule>  enoxaparin Injectable 30 milliGRAM(s) SubCutaneous every 12 hours  lactated ringers. 1000 milliLiter(s) (100 mL/Hr) IV Continuous <Continuous>    MEDICATIONS  (PRN):  fentaNYL    Injectable 25 MICROGram(s) IV Push every 5 minutes PRN Moderate Pain  ondansetron Injectable 4 milliGRAM(s) IV Push once PRN Nausea and/or Vomiting    73 year old female POD#0 s/p s/p ORIF R periprosthetic femur fx currently hemodynamically stable. Received ancef post operatively per ortho.      1. f/u UOP  2. Pain control available  3. restart DVT PPx with Lov   4. PT consult for 5/3   5. Encourage ICS and OOB to chair in AM POST OP CHECK NOTE     Patient seen and examined at bedside in PACU. POD#0 s/p ORIF R periprosthetic femur fx. Reports pain is well controlled. Tolerated sips in PACU with no n/v. Had an episode of incontinence post operatively.     PACU Vital Signs   /98 HR 97 RR 14 O2 91% on RA     I&O's Detail  Intraoperatively - EBL 500cc, IVFs 900 and given 2 units pRBCs intraoperatively.     Constitutional: Patient sleeping comfortably in stretcher  HEENT: EOMI / PERRL b/l  Neck: No JVD, full ROM without pain   Respiratory: CTAB  Cardiovascular: regular rate & rhythm   Gastrointestinal: Abdomen soft, non-tender, non-distended, no rebound tenderness / guarding   Neurological: GCS: 15 (4/5/6)  Psychiatric: Normal mood, normal affect  Musculoskeletal: RLE with provena vac in place, sterile surgical dressing c/d/i, appropriate tenderness to palpation, motor and sensory function intact     MEDICATIONS  (STANDING):  ceFAZolin  Injectable. 2000 milliGRAM(s) IV Push <User Schedule>  enoxaparin Injectable 30 milliGRAM(s) SubCutaneous every 12 hours  lactated ringers. 1000 milliLiter(s) (100 mL/Hr) IV Continuous <Continuous>    MEDICATIONS  (PRN):  fentaNYL    Injectable 25 MICROGram(s) IV Push every 5 minutes PRN Moderate Pain  ondansetron Injectable 4 milliGRAM(s) IV Push once PRN Nausea and/or Vomiting    73 year old female POD#0 s/p s/p ORIF R periprosthetic femur fx currently hemodynamically stable. Received ancef post operatively per ortho.      1. f/u UOP  2. Pain control available  3. restart DVT PPx with Lov .  4. PT consult for 5/3   5. Encourage ICS and OOB to chair in AM

## 2018-05-02 NOTE — BRIEF OPERATIVE NOTE - POST-OP DX
Closed displaced spiral fracture of shaft of right femur, initial encounter  05/02/2018    Active  Tomy Brunner

## 2018-05-03 ENCOUNTER — TRANSCRIPTION ENCOUNTER (OUTPATIENT)
Age: 73
End: 2018-05-03

## 2018-05-03 LAB
ANION GAP SERPL CALC-SCNC: 12 MMOL/L — SIGNIFICANT CHANGE UP (ref 5–17)
BUN SERPL-MCNC: 13 MG/DL — SIGNIFICANT CHANGE UP (ref 8–20)
CALCIUM SERPL-MCNC: 8 MG/DL — LOW (ref 8.6–10.2)
CHLORIDE SERPL-SCNC: 104 MMOL/L — SIGNIFICANT CHANGE UP (ref 98–107)
CO2 SERPL-SCNC: 23 MMOL/L — SIGNIFICANT CHANGE UP (ref 22–29)
CREAT SERPL-MCNC: 0.59 MG/DL — SIGNIFICANT CHANGE UP (ref 0.5–1.3)
GLUCOSE SERPL-MCNC: 273 MG/DL — HIGH (ref 70–115)
HCT VFR BLD CALC: 30.3 % — LOW (ref 37–47)
HGB BLD-MCNC: 10 G/DL — LOW (ref 12–16)
MAGNESIUM SERPL-MCNC: 2.1 MG/DL — SIGNIFICANT CHANGE UP (ref 1.6–2.6)
MCHC RBC-ENTMCNC: 29 PG — SIGNIFICANT CHANGE UP (ref 27–31)
MCHC RBC-ENTMCNC: 33 G/DL — SIGNIFICANT CHANGE UP (ref 32–36)
MCV RBC AUTO: 87.8 FL — SIGNIFICANT CHANGE UP (ref 81–99)
PHOSPHATE SERPL-MCNC: 2.4 MG/DL — SIGNIFICANT CHANGE UP (ref 2.4–4.7)
PLATELET # BLD AUTO: 347 K/UL — SIGNIFICANT CHANGE UP (ref 150–400)
POTASSIUM SERPL-MCNC: 4.4 MMOL/L — SIGNIFICANT CHANGE UP (ref 3.5–5.3)
POTASSIUM SERPL-SCNC: 4.4 MMOL/L — SIGNIFICANT CHANGE UP (ref 3.5–5.3)
RBC # BLD: 3.45 M/UL — LOW (ref 4.4–5.2)
RBC # FLD: 14.2 % — SIGNIFICANT CHANGE UP (ref 11–15.6)
SODIUM SERPL-SCNC: 139 MMOL/L — SIGNIFICANT CHANGE UP (ref 135–145)
WBC # BLD: 15.2 K/UL — HIGH (ref 4.8–10.8)
WBC # FLD AUTO: 15.2 K/UL — HIGH (ref 4.8–10.8)

## 2018-05-03 PROCEDURE — 99222 1ST HOSP IP/OBS MODERATE 55: CPT | Mod: GC

## 2018-05-03 RX ORDER — GABAPENTIN 400 MG/1
300 CAPSULE ORAL DAILY
Qty: 0 | Refills: 0 | Status: DISCONTINUED | OUTPATIENT
Start: 2018-05-03 | End: 2018-05-05

## 2018-05-03 RX ORDER — NIACIN 50 MG
500 TABLET ORAL AT BEDTIME
Qty: 0 | Refills: 0 | Status: DISCONTINUED | OUTPATIENT
Start: 2018-05-03 | End: 2018-05-05

## 2018-05-03 RX ORDER — OXYCODONE HYDROCHLORIDE 5 MG/1
5 TABLET ORAL EVERY 4 HOURS
Qty: 0 | Refills: 0 | Status: DISCONTINUED | OUTPATIENT
Start: 2018-05-03 | End: 2018-05-03

## 2018-05-03 RX ORDER — ONDANSETRON 8 MG/1
4 TABLET, FILM COATED ORAL ONCE
Qty: 0 | Refills: 0 | Status: DISCONTINUED | OUTPATIENT
Start: 2018-05-03 | End: 2018-05-05

## 2018-05-03 RX ORDER — SENNA PLUS 8.6 MG/1
1 TABLET ORAL DAILY
Qty: 0 | Refills: 0 | Status: DISCONTINUED | OUTPATIENT
Start: 2018-05-03 | End: 2018-05-05

## 2018-05-03 RX ORDER — LACTULOSE 10 G/15ML
30 SOLUTION ORAL ONCE
Qty: 0 | Refills: 0 | Status: COMPLETED | OUTPATIENT
Start: 2018-05-03 | End: 2018-05-03

## 2018-05-03 RX ORDER — SIMVASTATIN 20 MG/1
20 TABLET, FILM COATED ORAL AT BEDTIME
Qty: 0 | Refills: 0 | Status: DISCONTINUED | OUTPATIENT
Start: 2018-05-03 | End: 2018-05-05

## 2018-05-03 RX ORDER — ACETAMINOPHEN 500 MG
650 TABLET ORAL EVERY 6 HOURS
Qty: 0 | Refills: 0 | Status: DISCONTINUED | OUTPATIENT
Start: 2018-05-03 | End: 2018-05-05

## 2018-05-03 RX ORDER — DOCUSATE SODIUM 100 MG
100 CAPSULE ORAL DAILY
Qty: 0 | Refills: 0 | Status: DISCONTINUED | OUTPATIENT
Start: 2018-05-03 | End: 2018-05-05

## 2018-05-03 RX ORDER — OXYCODONE HYDROCHLORIDE 5 MG/1
10 TABLET ORAL EVERY 4 HOURS
Qty: 0 | Refills: 0 | Status: DISCONTINUED | OUTPATIENT
Start: 2018-05-03 | End: 2018-05-03

## 2018-05-03 RX ADMIN — ENOXAPARIN SODIUM 30 MILLIGRAM(S): 100 INJECTION SUBCUTANEOUS at 17:19

## 2018-05-03 RX ADMIN — SIMVASTATIN 20 MILLIGRAM(S): 20 TABLET, FILM COATED ORAL at 22:42

## 2018-05-03 RX ADMIN — Medication 2000 MILLIGRAM(S): at 09:08

## 2018-05-03 RX ADMIN — GABAPENTIN 300 MILLIGRAM(S): 400 CAPSULE ORAL at 11:02

## 2018-05-03 RX ADMIN — Medication 500 MILLIGRAM(S): at 22:42

## 2018-05-03 RX ADMIN — Medication 100 MILLIGRAM(S): at 11:04

## 2018-05-03 RX ADMIN — BENZOCAINE AND MENTHOL 1 LOZENGE: 5; 1 LIQUID ORAL at 01:54

## 2018-05-03 RX ADMIN — Medication 2000 MILLIGRAM(S): at 01:54

## 2018-05-03 RX ADMIN — SENNA PLUS 1 TABLET(S): 8.6 TABLET ORAL at 11:04

## 2018-05-03 RX ADMIN — LACTULOSE 30 GRAM(S): 10 SOLUTION ORAL at 11:03

## 2018-05-03 RX ADMIN — ENOXAPARIN SODIUM 30 MILLIGRAM(S): 100 INJECTION SUBCUTANEOUS at 05:39

## 2018-05-03 NOTE — DISCHARGE NOTE ADULT - CARE PROVIDER_API CALL
Tomy Brunner), Orthopaedic Surgery  [address] [place] [address]  Phone: [phone]  Fax: [phone]

## 2018-05-03 NOTE — DISCHARGE NOTE ADULT - MEDICATION SUMMARY - MEDICATIONS TO TAKE
I will START or STAY ON the medications listed below when I get home from the hospital:    aspirin 325 mg oral tablet  -- 1 tab(s) by mouth once a day  -- Indication: For per pmd    acetaminophen 325 mg oral tablet  -- 2 tab(s) by mouth every 6 hours, As needed, Mild Pain (1 - 3)  -- Indication: For pain    enoxaparin 30 mg/0.3 mL injectable solution  -- 30 milligram(s) subcutaneously every 12 hours   -- It is very important that you take or use this exactly as directed.  Do not skip doses or discontinue unless directed by your doctor.    -- Indication: For dvt ppx    Neurontin 300 mg oral capsule  --  by mouth once a day  -- Indication: For per pmd    Elavil  -- 10 milligram(s) by mouth every other day (at bedtime)  -- Indication: For per pdm    Elavil  --  to each affected eye every other day  -- Indication: For per pmd    Simcor 1000 mg-20 mg oral tablet, extended release  -- 1 tab(s) by mouth once a day (at bedtime)  -- Indication: For per pmd    Simcor 1000 mg-20 mg oral tablet, extended release  -- 1 tab(s) by mouth once a day (at bedtime)  -- Indication: For per pmd    Colace sodium 50 mg oral capsule  -- 6  by mouth once a day  -- Indication: For per pmd    Enemeez Plus 20 mg-283 mg rectal enema  -- 1 application rectally once a day  -- Indication: For per pmd    Colace sodium 100 mg oral capsule  --  by mouth once a day  -- Indication: For per pmd    Cipro 500 mg oral tablet  -- 1 tab(s) by mouth every 12 hours  -- for 10 days use ALL medication  -- Indication: For per pmd    Detrol 1 mg oral tablet  --  by mouth every other day  -- Indication: For per pmd    Multi Sheila Bets and Fluoride  -- 1  by mouth once a day  -- Indication: For per pmd    Vitamin D2  -- 2000  by mouth once a day  -- Indication: For per pmd

## 2018-05-03 NOTE — PHYSICAL THERAPY INITIAL EVALUATION ADULT - CRITERIA FOR SKILLED THERAPEUTIC INTERVENTIONS
therapy frequency/functional limitations in following categories/anticipated discharge recommendation/predicted duration of therapy intervention/rehab potential/impairments found

## 2018-05-03 NOTE — DISCHARGE NOTE ADULT - MEDICATION SUMMARY - MEDICATIONS TO STOP TAKING
I will STOP taking the medications listed below when I get home from the hospital:  None I will STOP taking the medications listed below when I get home from the hospital:    Cipro 500 mg oral tablet  -- 1 tab(s) by mouth every 12 hours  -- for 10 days use ALL medication

## 2018-05-03 NOTE — DISCHARGE NOTE ADULT - CARE PLAN
Principal Discharge DX:	Closed nondisplaced transverse fracture of shaft of right femur, initial encounter  Goal:	Fracture Fixation, Decrease Pain  Assessment and plan of treatment:	ORTHOPEDIC DISCHARGE RECOMENDATIONS Principal Discharge DX:	Closed nondisplaced transverse fracture of shaft of right femur, initial encounter  Goal:	Fracture Fixation, Decrease Pain  Assessment and plan of treatment:	ORTHOPEDIC DISCHARGE RECOMMENDATIONS:  - NWB RLE  - full ROM RLE  - PT/OT  -  LMWH or equivalent x 4 weeks  - f/u ortho ~5/18 for wound check and staple removal  - continue dressings until wounds are dry, then leave open to air  - pain control as needed

## 2018-05-03 NOTE — PHYSICAL THERAPY INITIAL EVALUATION ADULT - PERTINENT HX OF CURRENT PROBLEM, REHAB EVAL
pt presents to [facility] due to s/p ORIF right periprosthetic femur fx 5/2/18, hx of right hip IMN 4/23/18

## 2018-05-03 NOTE — DISCHARGE NOTE ADULT - PATIENT PORTAL LINK FT
You can access the [facility] Patient Portal, offered by [facility], by registering with the following website: [email or web address]

## 2018-05-03 NOTE — CONSULT NOTE ADULT - ATTENDING COMMENTS
Depending on eval, will determine dispo. Patient wants to go home.
74 yo female right femur periprosthetic fracture  Discussed with PA overnight, reviewed Xrays.  She will need ORIF for the fracture.  Will discuss with Dr. Brunner for a detailed plan.

## 2018-05-03 NOTE — DISCHARGE NOTE ADULT - HOSPITAL COURSE
72 y/o F transfer from [facility] with R hip periprosthetic fracture s/p R hip IMN 4/23/18. Previous to surgery, pt reports waking up 2 days prior and being in extreme pain, was sent to [facility] where workup revealed hip fracture. on POD3, pt discharged home with home PT and 2 days later on 4/28, visiting nurse and PT noticed pt with worsening swelling or RLE, and clicking. Pt currently states swelling is present and stable but denies worsening pain. Pt has cauda equina syndrome 11 years past after neurostimulator implant surgery for chronic lower back pain caused an iatrogenic epidural hematoma. Pt was initially wheel chair bound but prior to recent R hip surgery was able to ambulate with assistance and cane. Pt also was worked up for CLL by PMD referred hematologist/oncologist, workup negative for malignancy, never received any chemo/immuno/radiation therapy. Patient underwent an open reduction internal fixation of the right distal femur to repair the periprosthetic fracture. She was made NWB of the right LE after surgery. She was evaluated and worked with PT. She was started on Lovenox for clot prevention. 74 y/o F transfer from [facility] with R hip periprosthetic fracture s/p R hip IMN 4/23/18. Previous to surgery, pt reports waking up 2 days prior and being in extreme pain, was sent to [facility] where workup revealed hip fracture. on POD3, pt discharged home with home PT and 2 days later on 4/28, visiting nurse and PT noticed pt with worsening swelling or RLE, and clicking. Pt currently states swelling is present and stable but denies worsening pain. Pt has cauda equina syndrome 11 years past after neurostimulator implant surgery for chronic lower back pain caused an iatrogenic epidural hematoma. Pt was initially wheel chair bound but prior to recent R hip surgery was able to ambulate with assistance and cane. Pt also was worked up for CLL by PMD referred hematologist/oncologist, workup negative for malignancy, never received any chemo/immuno/radiation therapy. Patient underwent an open reduction internal fixation of the right distal femur to repair the periprosthetic fracture. She was made NWB of the right LE after surgery. She was evaluated and worked with PT. She was started on Lovenox for clot prevention.   PT and PMR evaluated patient during her hospital stay and recommended acute rehab.   Upon discharge, per ortho recommendations the patient will remain non-weight bearing right lower extremity.  Her pain is well controlled, tolerating diet, labs/vitals reviewed and stable. Per trauma and ortho attending, she is safe for d/c to rehab facility with outpatient follow-up.   Appropriate medications have been sent to her pharmacy.

## 2018-05-03 NOTE — PATIENT PROFILE ADULT. - --DESCRIBE SURGICAL SITE
right leg with 3 dressings to right proximal thigh/knee and one to right medial knee. Ecchymos, swelling to right thigh and pitting edema to right shin/foot

## 2018-05-03 NOTE — PHYSICAL THERAPY INITIAL EVALUATION ADULT - ADDITIONAL COMMENTS
pt states owns and uses a SAC, right KAFO, owns a RW and W/C, ramps to enter home,  unable to assist

## 2018-05-03 NOTE — CONSULT NOTE ADULT - SUBJECTIVE AND OBJECTIVE BOX
HPI:  73F PMHx recent right hip IM nail (4/23/18) due to mechanical fall, right LE hemiparesis secondary to cauda equina 11yrs ago, presented to [facility] 5/1 with pain, clicking and trouble walking on the right LE for 2 days. Patient had been receiving home care services due to recent hip surgery and visiting nurse noticed her right LE was edematous and painful. She was sent to [facility] for evaluation and found to have a right periprosthetic femur fracture. Denied falling on to the hip or any trauma. She was then transferred to [facility] at her request and underwent ORIF with Dr. Brunner on 5/2. Post-op course has been uncomplicated.       REVIEW OF SYSTEMS  Constitutional - No fever, No fatigue  HEENT - No visual disturbances, No difficulty hearing,  No neck pain  Respiratory - No cough, No wheezing, No shortness of breath  Cardiovascular - No chest pain, No palpitations  Gastrointestinal - Chronic constipation, no BM since surgery, has been passing gas, denies N/V  Genitourinary - No dysuria, No frequency, No incontinence  Neurological - No headaches, Chronic right LE weakness, tingling at the bottom of right foot at baseline  Skin - No itching, No rashes  Endocrine - No temperature intolerance  Musculoskeletal - Denies right hip pain  Psychiatric - No depression, No anxiety  All other review of systems negative      PAST MEDICAL & SURGICAL HISTORY  COPD (chronic obstructive pulmonary disease)  Diverticulosis  Neuropathy  History of fusion of cervical spine  Cauda equina syndrome with residual right LE monoparesis  CLL (chronic lymphocytic leukemia)  GERD (gastroesophageal reflux disease)  HLD (hyperlipidemia)  Epidural hematoma  Myelopathy  Femur fracture, right s/p IM nail  Malfunction of nerve stimulator lead  Knee meniscus pain  Benign ovarian cyst  Cervical arthritis  H/O lumbar discectomy  History of laminectomy      SOCIAL HISTORY  Smoking - Denied  EtOH - Denied   Drugs - Denied    FUNCTIONAL HISTORY  Right hand dominant  Lives with  in private house, WC accessible  Was ambulating with RW independently prior to admission, SAC with PT, required min assist for ADLs due to recent hip fracture  At baseline is independent    CURRENT FUNCTIONAL STATUS  Turns, bridges and scoots in bed independently    FAMILY HISTORY   Reviewed and non-contributory    ALLERGIES  No Known Allergies      VITALS  T(C): 37 (03 May 2018 08:09), Max: 37.3 (02 May 2018 19:17)  T(F): 98.6 (03 May 2018 08:09), Max: 99.1 (02 May 2018 19:17)  HR: 90 (03 May 2018 08:09) (90 - 99)  BP: 101/54 (03 May 2018 08:09) (101/54 - 139/67)  RR: 18 (03 May 2018 08:09) (13 - 18)  SpO2: 95% (03 May 2018 08:09) (93% - 97%)      RECENT LABS/IMAGING                        10.0   15.2  )-----------( 347      ( 03 May 2018 06:10 )             30.3     05-03    139  |  104  |  13.0  ----------------------------<  273<H>  4.4   |  23.0  |  0.59    Ca    8.0<L>      03 May 2018 06:10  Phos  2.4     05-03  Mg     2.1     05-03    TPro  5.2<L>  /  Alb  2.7<L>  /  TBili  1.1  /  DBili  x   /  AST  21  /  ALT  20  /  AlkPhos  87  05-01    PT/INR - ( 01 May 2018 22:45 )   PT: 14.1 sec;   INR: 1.28 ratio        EXAM:  XR FEMUR 2 VIEWS RT                        EXAM:  XR HIP 2-3V RT                          PROCEDURE DATE:  05/01/2018      INTERPRETATION:  Right hip and femur    Indication: Right leg pain post trauma, history of right hip fracture    Comparison: None    IMPRESSION: 6 views demonstrate periprostatic fracture of the mid distal   femoral shaft mildly displaced and angulated just distal to the   prosthesis. Comment aorta and screw fixation of the right   intratrochanteric fracture is noted. There are overlying skin staples the   right lateral hip.          MEDICATIONS   MEDICATIONS  (STANDING):  docusate sodium 100 milliGRAM(s) Oral daily  enoxaparin Injectable 30 milliGRAM(s) SubCutaneous every 12 hours  gabapentin Oral Tab/Cap - Peds 300 milliGRAM(s) Oral daily  niacin  milliGRAM(s) Oral at bedtime  senna 1 Tablet(s) Oral daily  simvastatin 20 milliGRAM(s) Oral at bedtime    MEDICATIONS  (PRN):  acetaminophen   Tablet. 650 milliGRAM(s) Oral every 6 hours PRN Mild Pain (1 - 3)  benzocaine 15 mG/menthol 3.6 mG Lozenge 1 Lozenge Oral every 4 hours PRN Sore Throat  ondansetron Injectable 4 milliGRAM(s) IV Push once PRN Nausea and/or Vomiting      PHYSICAL EXAM  Constitutional - NAD, Comfortable  HEENT - NCAT, EOMI  Neck - Supple  Chest - CTA bilaterally  Cardiovascular - RRR, S1S2  Abdomen - BS+, Soft, NTND  Extremities - Right LE edema, right LE dressing c/d/i, No calf tenderness   Neurologic Exam -                    Cognitive - Awake, Alert, AAO to self, place, date, year, situation     Communication - Fluent     Cranial Nerves - CN 2-12 grossly intact     Motor -                     LEFT    UE - ShAB 5/5, EF 5/5, EE 5/5, WE 5/5,  5/5                    RIGHT UE - ShAB 5/5, EF 5/5, EE 5/5, WE 5/5,  5/5                    LEFT    LE - HF 5/5, KE 5/5, DF 5/5, PF 5/5                    RIGHT LE - HF 3/5, KE 3/5, DF 5/5, PF 5/5        Sensory - Decreased to light touch in right foot - patient reports it is at baseline     Reflexes - Right achilles 1+, left achilles 2+      Coordination - Finger-to-nose intact bilaterally   Psychiatric - Affect WNL        ASSESSMENT/PLAN  73F s/p ORIF for right periprosthetic femur fracture now with functional, gait, ADL impairments.    Disposition - Will continue to follow and monitor progress with therapy  PT - ROM, Bed mobility, Transfers, Ambulation with assistive device  OT - ADLs, ROM  Precautions - Falls  DVT Prophylaxis - Lovenox  Weight bearing status - NWB right LE  Skin - Turn Q2hrs HPI:  73F PMHx recent right hip IM nail (4/23/18) due to mechanical fall, right LE hemiparesis secondary to cauda equina 11yrs ago, presented to [facility] 5/1 with pain, clicking and trouble walking on the right LE for 2 days. Patient had been receiving home care services due to recent hip surgery and visiting nurse noticed her right LE was edematous and painful. She was sent to [facility] for evaluation and found to have a right periprosthetic femur fracture. Denied falling on to the hip or any trauma. She was then transferred to [facility] at her request and underwent ORIF with Dr. Brunner on 5/2. Post-op course has been uncomplicated. Patient follows with Dr. Allen for outpatient rehab needs.      REVIEW OF SYSTEMS  Constitutional - No fever, No fatigue  HEENT - No visual disturbances, No difficulty hearing,  No neck pain  Respiratory - No cough, No wheezing, No shortness of breath  Cardiovascular - No chest pain, No palpitations  Gastrointestinal - Chronic constipation, no BM since surgery, has been passing gas, denies N/V  Genitourinary - No dysuria, No frequency, No incontinence  Neurological - No headaches, Chronic right LE weakness, tingling at the bottom of right foot at baseline  Skin - No itching, No rashes  Endocrine - No temperature intolerance  Musculoskeletal - Denies right hip pain  Psychiatric - Depressed and feels set back in progress with walking  All other review of systems negative      PAST MEDICAL & SURGICAL HISTORY  COPD (chronic obstructive pulmonary disease)  Diverticulosis  Neuropathy  History of fusion of cervical spine  Cauda equina syndrome with residual right LE monoparesis  CLL (chronic lymphocytic leukemia)  GERD (gastroesophageal reflux disease)  HLD (hyperlipidemia)  Epidural hematoma  Myelopathy  Femur fracture, right s/p IM nail  Malfunction of nerve stimulator lead  Knee meniscus pain  Benign ovarian cyst  Cervical arthritis  H/O lumbar discectomy  History of laminectomy      SOCIAL HISTORY  Smoking - Denied  EtOH - Denied   Drugs - Denied    FUNCTIONAL HISTORY  Right hand dominant  Lives with  in private house, WC accessible  Was ambulating with RW independently prior to admission, SAC with PT, required min assist for ADLs due to recent hip fracture  At baseline is independent    CURRENT FUNCTIONAL STATUS  Turns, bridges and scoots in bed independently    FAMILY HISTORY   Reviewed and non-contributory    ALLERGIES  No Known Allergies      VITALS  T(C): 37 (03 May 2018 08:09), Max: 37.3 (02 May 2018 19:17)  T(F): 98.6 (03 May 2018 08:09), Max: 99.1 (02 May 2018 19:17)  HR: 90 (03 May 2018 08:09) (90 - 99)  BP: 101/54 (03 May 2018 08:09) (101/54 - 139/67)  RR: 18 (03 May 2018 08:09) (13 - 18)  SpO2: 95% (03 May 2018 08:09) (93% - 97%)      RECENT LABS/IMAGING                        10.0   15.2  )-----------( 347      ( 03 May 2018 06:10 )             30.3     05-03    139  |  104  |  13.0  ----------------------------<  273<H>  4.4   |  23.0  |  0.59    Ca    8.0<L>      03 May 2018 06:10  Phos  2.4     05-03  Mg     2.1     05-03    TPro  5.2<L>  /  Alb  2.7<L>  /  TBili  1.1  /  DBili  x   /  AST  21  /  ALT  20  /  AlkPhos  87  05-01    PT/INR - ( 01 May 2018 22:45 )   PT: 14.1 sec;   INR: 1.28 ratio        EXAM:  XR FEMUR 2 VIEWS RT                        EXAM:  XR HIP 2-3V RT                          PROCEDURE DATE:  05/01/2018      INTERPRETATION:  Right hip and femur    Indication: Right leg pain post trauma, history of right hip fracture    Comparison: None    IMPRESSION: 6 views demonstrate periprostatic fracture of the mid distal   femoral shaft mildly displaced and angulated just distal to the   prosthesis. Comment aorta and screw fixation of the right   intratrochanteric fracture is noted. There are overlying skin staples the   right lateral hip.          MEDICATIONS   MEDICATIONS  (STANDING):  docusate sodium 100 milliGRAM(s) Oral daily  enoxaparin Injectable 30 milliGRAM(s) SubCutaneous every 12 hours  gabapentin Oral Tab/Cap - Peds 300 milliGRAM(s) Oral daily  niacin  milliGRAM(s) Oral at bedtime  senna 1 Tablet(s) Oral daily  simvastatin 20 milliGRAM(s) Oral at bedtime    MEDICATIONS  (PRN):  acetaminophen   Tablet. 650 milliGRAM(s) Oral every 6 hours PRN Mild Pain (1 - 3)  benzocaine 15 mG/menthol 3.6 mG Lozenge 1 Lozenge Oral every 4 hours PRN Sore Throat  ondansetron Injectable 4 milliGRAM(s) IV Push once PRN Nausea and/or Vomiting      PHYSICAL EXAM  Constitutional - NAD, Comfortable  HEENT - NCAT, EOMI  Neck - Supple  Chest - CTA bilaterally  Cardiovascular - RRR, S1S2  Abdomen - BS+, Soft, NTND  Extremities - Right LE edema, right thigh wound vac intact, No calf tenderness   Neurologic Exam -                    Cognitive - Awake, Alert, AAO to self, place, date, year, situation     Communication - Fluent     Cranial Nerves - CN 2-12 grossly intact     Motor -                     LEFT    UE - ShAB 5/5, EF 5/5, EE 5/5, WE 5/5,  5/5                    RIGHT UE - ShAB 5/5, EF 5/5, EE 5/5, WE 5/5,  5/5                    LEFT    LE - HF 5/5, KE 5/5, DF 5/5, PF 5/5                    RIGHT LE - HF 3/5, KE 3/5, DF 5/5, PF 5/5        Sensory - Decreased to light touch in right foot - patient reports it is at baseline     Reflexes - Right achilles 1+, left achilles 2+    Psychiatric - Anxious        ASSESSMENT/PLAN  73F s/p ORIF for right periprosthetic femur fracture now with functional, gait, ADL impairments.    Disposition - Will continue to follow and monitor progress with therapy  PT - ROM, Bed mobility, Transfers, Ambulation with assistive device  OT - ADLs, ROM  Precautions - Falls  DVT Prophylaxis - Lovenox  Weight bearing status - NWB right LE  Skin - Turn Q2hrs

## 2018-05-03 NOTE — DISCHARGE NOTE ADULT - PLAN OF CARE
Fracture Fixation, Decrease Pain ORTHOPEDIC DISCHARGE RECOMENDATIONS ORTHOPEDIC DISCHARGE RECOMMENDATIONS:  - NWB RLE  - full ROM RLE  - PT/OT  -  LMWH or equivalent x 4 weeks  - f/u ortho ~5/18 for wound check and staple removal  - continue dressings until wounds are dry, then leave open to air  - pain control as needed

## 2018-05-03 NOTE — PROGRESS NOTE ADULT - SUBJECTIVE AND OBJECTIVE BOX
Acute Care Surgery /Trauma PROGRESS NOTE:     SUBJECTIVE: Pt seen and examined at bedside. No acute overnight events. Pain well controlled. Tolerating diet, no n/v. Voiding well. Passing flatus, normal BM. Denies f/c/sob/cp. Denies numbness/tingling. Would like to go outside with     Vital Signs Last 24 Hrs  T(C): 37 (03 May 2018 08:09), Max: 37.8 (02 May 2018 11:10)  T(F): 98.6 (03 May 2018 08:09), Max: 100 (02 May 2018 11:10)  HR: 90 (03 May 2018 08:09) (90 - 101)  BP: 101/54 (03 May 2018 08:09) (101/54 - 139/67)  BP(mean): --  RR: 18 (03 May 2018 08:09) (13 - 20)  SpO2: 95% (03 May 2018 08:09) (93% - 97%)    OBJECTIVE:  NAD  NC/AT  RRR  No respiratory distress  Abd soft, nondistended, nontender, no guarding or rebound. No peritoneal signs.  RLE compartments soft incision c/d/i with prevena vac, motor and sensation intact, 2+ DP pulses    No pedal edema    I&O's Detail    02 May 2018 07:01  -  03 May 2018 07:00  --------------------------------------------------------  IN:    IV PiggyBack: 200 mL    lactated ringers.: 1200 mL    Oral Fluid: 400 mL    Packed Red Blood Cells: 600 mL  Total IN: 2400 mL    OUT:    Estimated Blood Loss: 500 mL    Voided: 150 mL  Total OUT: 650 mL    Total NET: 1750 mL          MEDICATIONS  (STANDING):  ceFAZolin  Injectable. 2000 milliGRAM(s) IV Push <User Schedule>  docusate sodium 100 milliGRAM(s) Oral daily  enoxaparin Injectable 30 milliGRAM(s) SubCutaneous every 12 hours  gabapentin Oral Tab/Cap - Peds 300 milliGRAM(s) Oral daily  lactulose Syrup 30 Gram(s) Oral once  niacin  milliGRAM(s) Oral at bedtime  senna 1 Tablet(s) Oral daily  simvastatin 20 milliGRAM(s) Oral at bedtime    MEDICATIONS  (PRN):  acetaminophen   Tablet. 650 milliGRAM(s) Oral every 6 hours PRN Mild Pain (1 - 3)  benzocaine 15 mG/menthol 3.6 mG Lozenge 1 Lozenge Oral every 4 hours PRN Sore Throat  ondansetron Injectable 4 milliGRAM(s) IV Push once PRN Nausea and/or Vomiting      LABS:                        10.0   15.2  )-----------( 347      ( 03 May 2018 06:10 )             30.3     05-03    139  |  104  |  13.0  ----------------------------<  273<H>  4.4   |  23.0  |  0.59    Ca    8.0<L>      03 May 2018 06:10  Phos  2.4     05-03  Mg     2.1     05-03    TPro  5.2<L>  /  Alb  2.7<L>  /  TBili  1.1  /  DBili  x   /  AST  21  /  ALT  20  /  AlkPhos  87  05-01    PT/INR - ( 01 May 2018 22:45 )   PT: 14.1 sec;   INR: 1.28 ratio                 RADIOLOGY & ADDITIONAL STUDIES:

## 2018-05-03 NOTE — PHYSICAL THERAPY INITIAL EVALUATION ADULT - GAIT PATTERN USED, PT EVAL
assist for steadying c turns, verbal cues for sequencing, decreased step length, increased jennifer UE support,

## 2018-05-03 NOTE — PROGRESS NOTE ADULT - SUBJECTIVE AND OBJECTIVE BOX
History:  Patient seen and eval at bedside. Patient has no complaints. Pain well controlled. Patient denies nausea, vomiting, chest pain, shortness of breath, abdominal pain or fever. No new complaints.     Vital Signs Last 24 Hrs  T(C): 36.5 (03 May 2018 04:54), Max: 37.8 (02 May 2018 11:10)  T(F): 97.7 (03 May 2018 04:54), Max: 100 (02 May 2018 11:10)  HR: 90 (03 May 2018 04:54) (87 - 101)  BP: 109/60 (03 May 2018 04:54) (105/50 - 139/67)  RR: 18 (03 May 2018 04:54) (13 - 20)  SpO2: 97% (03 May 2018 04:54) (93% - 97%)                          10.0   15.2  )-----------( 347      ( 03 May 2018 06:10 )             30.3     Physical exam: NAD, awake, alert  Right Lower extremity: The dressings are clean, dry and intact. No wound erythema, discharge, drainage is noted. Prevena VAC dressing C/D/I, canister empty. EHL/TA/GS/FHL intact,  Sensation to light touch is grossly intact distally. No foot drop. 1+ dorsalis pedis pulse, pedal edema present +1 pitting. Calf soft, NT B/L. No cyanosis.    Primary Orthopedic Assessment: s/p right femur ORIF POD1     Plan:   ·	DVT prophylaxis as prescribed - Lov, including use of compression devices and ankle pumps  ·	Continue physical therapy: Non-weight bearing of the right lower extremity, ROM allowed  ·	Pain control as clinically indicated  ·	Incentive spirometry encouraged  ·	Cont prevena VAC 5-7 days  ·	Cont care as per primary team  ·	Discharge planning: patient requesting home.

## 2018-05-03 NOTE — PROGRESS NOTE ADULT - ASSESSMENT
73F POD 1 s/p ORIF R periprosthetic femur fx doing well  -UOP not well recorded, strict I/Os  -, f/u H&H  -f/u PT/OT/PMR  -appreciate ortho recs  -NWB RLE, FROM  -may go outside

## 2018-05-04 PROCEDURE — 99232 SBSQ HOSP IP/OBS MODERATE 35: CPT

## 2018-05-04 RX ADMIN — Medication 500 MILLIGRAM(S): at 21:25

## 2018-05-04 RX ADMIN — SENNA PLUS 1 TABLET(S): 8.6 TABLET ORAL at 13:09

## 2018-05-04 RX ADMIN — Medication 100 MILLIGRAM(S): at 13:09

## 2018-05-04 RX ADMIN — ENOXAPARIN SODIUM 30 MILLIGRAM(S): 100 INJECTION SUBCUTANEOUS at 17:48

## 2018-05-04 RX ADMIN — GABAPENTIN 300 MILLIGRAM(S): 400 CAPSULE ORAL at 13:09

## 2018-05-04 RX ADMIN — ENOXAPARIN SODIUM 30 MILLIGRAM(S): 100 INJECTION SUBCUTANEOUS at 05:34

## 2018-05-04 RX ADMIN — SIMVASTATIN 20 MILLIGRAM(S): 20 TABLET, FILM COATED ORAL at 21:25

## 2018-05-04 NOTE — PROGRESS NOTE ADULT - SUBJECTIVE AND OBJECTIVE BOX
ORTHOPEDIC POST-OP PROGRESS NOTE:    Name: INNA BROOKS    MR #: [id number]    Procedure: s/p right femoral periprosthetic fracture      DOS: 5/2/2018      Pt comfortable without complaints, pain controlled. Denies CP, SOB, N/V, numbness/tingling. She was out of bed to wheel chair yesterday with assistance of PT.                             10.0   15.2  )-----------( 347      ( 03 May 2018 06:10 )             30.3       03 May 2018 06:10    139    |  104    |  13.0   ----------------------------<  273    4.4     |  23.0   |  0.59     Phos  2.4       03 May 2018 06:10  Mg     2.1       03 May 2018 06:10        Vital Signs Last 24 Hrs  T(C): 37.1 (05-04-18 @ 04:55), Max: 37.1 (05-04-18 @ 04:55)  T(F): 98.8 (05-04-18 @ 04:55), Max: 98.8 (05-04-18 @ 04:55)  HR: 92 (05-04-18 @ 04:55) (92 - 92)  BP: 113/61 (05-04-18 @ 04:55) (113/61 - 113/61)  BP(mean): --  RR: 18 (05-04-18 @ 04:55) (18 - 18)  SpO2: 93% (05-04-18 @ 04:55) (93% - 93%)      General Exam:    General: Pt Alert and oriented, NAD, controlled pain.    Prevna Vac Dressing: No discharge in tubing or chamber.     Skin: + staining of the proximal dressings. Wounds C/D/I with sutures. No bleeding. No erythema.    Pulses: 2+ dorsalis pedis pulse. Cap refill < 2 sec.    Sensation: Diminished to light touch of the right distal lower extremity.    Motor: No motor weaknesses found.        A/P: 73y Female  POD# 2 s/p s/p right femoral periprosthetic fracture    - Stable  - Pain Control  - DVT ppx as prescribed - Lovenox  - PT eval  - Weight bearing status: Non-weight bearing of the right LE  - dressings changed and Prevena vac continued - will remove prior to discharge

## 2018-05-04 NOTE — PROGRESS NOTE ADULT - SUBJECTIVE AND OBJECTIVE BOX
Patient in bed, realizes that although she wants to go home, would be better suited at [facility].  Patient needed assist upon therapy eval.   Patient continues to report no pain.     FUNCTIONAL PROGRESS  5/3  Transfer: Sit to Stand:     · Level of [place]	moderate assist (50% patients effort)	    Transfer: Stand to Sit:     · Level of [place]	moderate assist (50% patients effort)	    Gait Skills:     · Level of [place]	minimum assist (75% patients effort)	  · Assistive Device	rolling walker	  · Gait Distance	5 feet	  · Brace/Orthotics	NWB right LE	    REVIEW OF SYSTEMS  Constitutional - No fever,  No fatigue  HEENT - No vertigo, No neck pain  Neurological - No headaches, No memory loss, +loss of strength, +numbness, No tremors  Skin - No rashes, +lesions   Musculoskeletal - No joint pain, +joint swelling, No muscle pain  Psychiatric - +depression, No anxiety    VITALS  T(C): 36.9 (05-04-18 @ 07:38), Max: 37.1 (05-04-18 @ 04:55)  HR: 90 (05-04-18 @ 07:38) (90 - 102)  BP: 125/63 (05-04-18 @ 07:38) (105/58 - 126/57)  RR: 18 (05-04-18 @ 07:38) (18 - 18)  SpO2: 93% (05-04-18 @ 07:38) (93% - 100%)  Wt(kg): --    MEDICATIONS   acetaminophen   Tablet. 650 milliGRAM(s) every 6 hours PRN  benzocaine 15 mG/menthol 3.6 mG Lozenge 1 Lozenge every 4 hours PRN  docusate sodium 100 milliGRAM(s) daily  enoxaparin Injectable 30 milliGRAM(s) every 12 hours  gabapentin Oral Tab/Cap - Peds 300 milliGRAM(s) daily  niacin  milliGRAM(s) at bedtime  ondansetron Injectable 4 milliGRAM(s) once PRN  senna 1 Tablet(s) daily  simvastatin 20 milliGRAM(s) at bedtime      RECENT LABS/IMAGING  CBC Full  -  ( 03 May 2018 06:10 )  WBC Count : 15.2 K/uL  Hemoglobin : 10.0 g/dL  Hematocrit : 30.3 %  Platelet Count - Automated : 347 K/uL  Mean Cell Volume : 87.8 fl  Mean Cell Hemoglobin : 29.0 pg  Mean Cell Hemoglobin Concentration : 33.0 g/dL  Auto Neutrophil # : x  Auto Lymphocyte # : x  Auto Monocyte # : x  Auto Eosinophil # : x  Auto Basophil # : x  Auto Neutrophil % : x  Auto Lymphocyte % : x  Auto Monocyte % : x  Auto Eosinophil % : x  Auto Basophil % : x    05-03    139  |  104  |  13.0  ----------------------------<  273<H>  4.4   |  23.0  |  0.59    Ca    8.0<L>      03 May 2018 06:10  Phos  2.4     05-03  Mg     2.1     05-03          PHYSICAL EXAM  Constitutional - NAD, Comfortable  Extremities - Right LE edema, right thigh wound vac intact, No calf tenderness   Neurologic Exam -                    Cognitive - Awake, Alert, AAO to self, place, date, year, situation     Motor -    RIGHT LE - HF 3/5, KE 3/5, DF 5/5, PF 5/5        Sensory - Decreased to light touch in right foot - patient reports it is at baseline  Psychiatric - Down about she misses her  and not going home      ASSESSMENT/PLAN  73F s/p ORIF for right periprosthetic femur fracture now with functional, gait, ADL impairments.  Pain - Tylenol, Neurontin  DVT PPX - Lovenox  Rehab - Recommend ACUTE inpatient rehabilitation for the functional deficits consisting of 3 hours of therapy/day & 24 hour RN/daily PMR physician for comorbid medical management. Will continue to follow for ongoing rehab needs and recommendations.     Continue bedside therapy as well as OOB throughout the day with mobilization throughout the day with staff to maintain cardiopulmonary function and prevention of secondary complications related to debility.

## 2018-05-04 NOTE — PROGRESS NOTE ADULT - SUBJECTIVE AND OBJECTIVE BOX
Acute Care Surgery /Trauma PROGRESS NOTE:     SUBJECTIVE: Pt seen and examined at bedside. No acute overnight events. Pain well controlled. Tolerating diet, no n/v. Voiding well. Passing flatus, had BM. Denies f/c/sob/cp. Went outside in wheelchair with . worked with PT-acute rehab. SW pending placement    Vital Signs Last 24 Hrs  T(C): 36.9 (04 May 2018 07:38), Max: 37.1 (04 May 2018 04:55)  T(F): 98.5 (04 May 2018 07:38), Max: 98.8 (04 May 2018 04:55)  HR: 90 (04 May 2018 07:38) (90 - 102)  BP: 125/63 (04 May 2018 07:38) (105/58 - 126/57)  BP(mean): --  RR: 18 (04 May 2018 07:38) (18 - 18)  SpO2: 93% (04 May 2018 07:38) (93% - 100%)    OBJECTIVE:  NAD  NC/AT  RRR  No respiratory distress  Abd soft, nondistended, nontender, no guarding or rebound. No peritoneal signs.  RLE compartments soft incision c/d/i with prevena vac, motor and sensation intact, 2+ DP pulses      I&O's Detail    03 May 2018 07:01  -  04 May 2018 07:00  --------------------------------------------------------  IN:    Oral Fluid: 720 mL  Total IN: 720 mL    OUT:  Total OUT: 0 mL    Total NET: 720 mL      04 May 2018 07:01  -  04 May 2018 13:41  --------------------------------------------------------  IN:    Oral Fluid: 240 mL  Total IN: 240 mL    OUT:  Total OUT: 0 mL    Total NET: 240 mL          MEDICATIONS  (STANDING):  docusate sodium 100 milliGRAM(s) Oral daily  enoxaparin Injectable 30 milliGRAM(s) SubCutaneous every 12 hours  gabapentin Oral Tab/Cap - Peds 300 milliGRAM(s) Oral daily  niacin  milliGRAM(s) Oral at bedtime  senna 1 Tablet(s) Oral daily  simvastatin 20 milliGRAM(s) Oral at bedtime    MEDICATIONS  (PRN):  acetaminophen   Tablet. 650 milliGRAM(s) Oral every 6 hours PRN Mild Pain (1 - 3)  benzocaine 15 mG/menthol 3.6 mG Lozenge 1 Lozenge Oral every 4 hours PRN Sore Throat  ondansetron Injectable 4 milliGRAM(s) IV Push once PRN Nausea and/or Vomiting

## 2018-05-04 NOTE — PROGRESS NOTE ADULT - SUBJECTIVE AND OBJECTIVE BOX
Notified by [facility] Nursing staff Patient's Prevena vac not functioning properly  After failed attempt with tegaderm to reinforce dressing, Prevena was removed and replaced   New Prevena functioning well  Surrounding dressings also replaced  All incisions healing well, no clinical signs of infection

## 2018-05-04 NOTE — PROGRESS NOTE ADULT - ASSESSMENT
73F POD 2 s/p ORIF R periprosthetic femur fx doing well  -UOP not well recorded, strict I/Os  -, f/u H&H  -f/u SW for dispo  -appreciate ortho recs  -NWB RLE, FROM  -may go outside

## 2018-05-05 ENCOUNTER — INPATIENT (INPATIENT)
Facility: HOSPITAL | Age: 73
LOS: 11 days | Discharge: HOME HEALTH | DRG: 950 | End: 2018-05-17
Attending: PHYSICAL MEDICINE & REHABILITATION | Admitting: PHYSICAL MEDICINE & REHABILITATION
Payer: MEDICARE

## 2018-05-05 VITALS
HEART RATE: 101 BPM | DIASTOLIC BLOOD PRESSURE: 66 MMHG | SYSTOLIC BLOOD PRESSURE: 127 MMHG | TEMPERATURE: 99 F | RESPIRATION RATE: 18 BRPM | OXYGEN SATURATION: 97 %

## 2018-05-05 DIAGNOSIS — S72.146A NONDISPLACED INTERTROCHANTERIC FRACTURE OF UNSPECIFIED FEMUR, INITIAL ENCOUNTER FOR CLOSED FRACTURE: ICD-10-CM

## 2018-05-05 DIAGNOSIS — N83.20 UNSPECIFIED OVARIAN CYSTS: Chronic | ICD-10-CM

## 2018-05-05 DIAGNOSIS — Z98.89 OTHER SPECIFIED POSTPROCEDURAL STATES: Chronic | ICD-10-CM

## 2018-05-05 DIAGNOSIS — M25.569 PAIN IN UNSPECIFIED KNEE: Chronic | ICD-10-CM

## 2018-05-05 DIAGNOSIS — M25.561 PAIN IN RIGHT KNEE: Chronic | ICD-10-CM

## 2018-05-05 DIAGNOSIS — M97.01XD PERIPROSTHETIC FRACTURE AROUND INTERNAL PROSTHETIC RIGHT HIP JOINT, SUBSEQUENT ENCOUNTER: ICD-10-CM

## 2018-05-05 DIAGNOSIS — S72.91XD UNSPECIFIED FRACTURE OF RIGHT FEMUR, SUBSEQUENT ENCOUNTER FOR CLOSED FRACTURE WITH ROUTINE HEALING: ICD-10-CM

## 2018-05-05 DIAGNOSIS — K38.9 DISEASE OF APPENDIX, UNSPECIFIED: Chronic | ICD-10-CM

## 2018-05-05 DIAGNOSIS — M47.9 SPONDYLOSIS, UNSPECIFIED: Chronic | ICD-10-CM

## 2018-05-05 DIAGNOSIS — T85.118A: Chronic | ICD-10-CM

## 2018-05-05 RX ORDER — MINERAL OIL
30 OIL (ML) MISCELLANEOUS ONCE
Qty: 0 | Refills: 0 | Status: DISCONTINUED | OUTPATIENT
Start: 2018-05-05 | End: 2018-05-05

## 2018-05-05 RX ORDER — ENOXAPARIN SODIUM 100 MG/ML
30 INJECTION SUBCUTANEOUS
Qty: 30 | Refills: 0 | OUTPATIENT
Start: 2018-05-05 | End: 2018-06-03

## 2018-05-05 RX ORDER — MAGNESIUM HYDROXIDE 400 MG/1
30 TABLET, CHEWABLE ORAL DAILY
Qty: 0 | Refills: 0 | Status: DISCONTINUED | OUTPATIENT
Start: 2018-05-05 | End: 2018-05-05

## 2018-05-05 RX ORDER — ACETAMINOPHEN 500 MG
2 TABLET ORAL
Qty: 0 | Refills: 0 | COMMUNITY
Start: 2018-05-05

## 2018-05-05 RX ORDER — LACTULOSE 10 G/15ML
20 SOLUTION ORAL ONCE
Qty: 0 | Refills: 0 | Status: DISCONTINUED | OUTPATIENT
Start: 2018-05-05 | End: 2018-05-05

## 2018-05-05 RX ORDER — POLYETHYLENE GLYCOL 3350 17 G/17G
17 POWDER, FOR SOLUTION ORAL
Qty: 0 | Refills: 0 | Status: DISCONTINUED | OUTPATIENT
Start: 2018-05-05 | End: 2018-05-05

## 2018-05-05 RX ADMIN — MAGNESIUM HYDROXIDE 30 MILLILITER(S): 400 TABLET, CHEWABLE ORAL at 11:45

## 2018-05-05 RX ADMIN — ENOXAPARIN SODIUM 30 MILLIGRAM(S): 100 INJECTION SUBCUTANEOUS at 05:58

## 2018-05-05 RX ADMIN — Medication 100 MILLIGRAM(S): at 11:46

## 2018-05-05 RX ADMIN — GABAPENTIN 300 MILLIGRAM(S): 400 CAPSULE ORAL at 17:20

## 2018-05-05 RX ADMIN — POLYETHYLENE GLYCOL 3350 17 GRAM(S): 17 POWDER, FOR SOLUTION ORAL at 11:46

## 2018-05-05 RX ADMIN — Medication 650 MILLIGRAM(S): at 03:49

## 2018-05-05 NOTE — PROGRESS NOTE ADULT - SUBJECTIVE AND OBJECTIVE BOX
INNA BROOKS    [id number]    History:  The patient is status post orif right periprosthetic femur fx, POD # 3. Patient is doing well. The patient denies pain. The patient is participating in physical therapy. Denies nausea, vomiting, chest pain, shortness of breath, abdominal pain or fever. Complains of constipation. No other complaints. No acute motor or sensory changes are reported.    Vital Signs Last 24 Hrs  T(C): 37.2 (05 May 2018 07:52), Max: 37.7 (04 May 2018 23:53)  T(F): 99 (05 May 2018 07:52), Max: 99.8 (04 May 2018 23:53)  HR: 90 (05 May 2018 07:52) (90 - 107)  BP: 126/72 (05 May 2018 07:52) (126/72 - 156/76)  BP(mean): --  RR: 18 (05 May 2018 07:52) (18 - 18)  SpO2: 98% (05 May 2018 07:52) (98% - 99%)  I&O's Summary    04 May 2018 07:01  -  05 May 2018 07:00  --------------------------------------------------------  IN: 720 mL / OUT: 0 mL / NET: 720 mL                  MEDICATIONS  (STANDING):  docusate sodium 100 milliGRAM(s) Oral daily  enoxaparin Injectable 30 milliGRAM(s) SubCutaneous every 12 hours  gabapentin Oral Tab/Cap - Peds 300 milliGRAM(s) Oral daily  niacin  milliGRAM(s) Oral at bedtime  senna 1 Tablet(s) Oral daily  simvastatin 20 milliGRAM(s) Oral at bedtime    MEDICATIONS  (PRN):  acetaminophen   Tablet. 650 milliGRAM(s) Oral every 6 hours PRN Mild Pain (1 - 3)  benzocaine 15 mG/menthol 3.6 mG Lozenge 1 Lozenge Oral every 4 hours PRN Sore Throat  magnesium hydroxide Suspension 30 milliLiter(s) Oral daily PRN Constipation  ondansetron Injectable 4 milliGRAM(s) IV Push once PRN Nausea and/or Vomiting      Physical exam: Right lower extremity- The dressing is clean, dry and intact. Prevena vac dsg is intact, no output in cannister. No wound erythema, discharge, drainage is noted. Thigh compartments soft, NT.  No calf tenderness. Sensation to light touch is grossly intact distally. Motor function distally is 5/5. No foot drop. 2+ dorsalis pedis pulse. Capillary refill is less than 2 seconds. No cyanosis.    Primary Orthopedic Assessment:  • S/P ORIF right femur, POD#3    Plan:   - Prevena vac dsg  • DVT prophylaxis as prescribed- lovenox, including use of compression devices and ankle pumps  • Continue physical therapy  • Non-weight bearing of the right lower extremity  • Pain control as clinically indicated  • Incentive spirometry encouraged  • Discharge planning – anticipated discharge is subacute rehabilitation INNA BROOKS    [id number]    History:  The patient is status post orif right periprosthetic femur fx, POD # 3. Patient is doing well. The patient denies pain. The patient is participating in physical therapy. Denies nausea, vomiting, chest pain, shortness of breath, abdominal pain or fever. Complains of constipation. No other complaints. No acute motor or sensory changes are reported.    Vital Signs Last 24 Hrs  T(C): 37.2 (05 May 2018 07:52), Max: 37.7 (04 May 2018 23:53)  T(F): 99 (05 May 2018 07:52), Max: 99.8 (04 May 2018 23:53)  HR: 90 (05 May 2018 07:52) (90 - 107)  BP: 126/72 (05 May 2018 07:52) (126/72 - 156/76)  BP(mean): --  RR: 18 (05 May 2018 07:52) (18 - 18)  SpO2: 98% (05 May 2018 07:52) (98% - 99%)  I&O's Summary    04 May 2018 07:01  -  05 May 2018 07:00  --------------------------------------------------------  IN: 720 mL / OUT: 0 mL / NET: 720 mL                  MEDICATIONS  (STANDING):  docusate sodium 100 milliGRAM(s) Oral daily  enoxaparin Injectable 30 milliGRAM(s) SubCutaneous every 12 hours  gabapentin Oral Tab/Cap - Peds 300 milliGRAM(s) Oral daily  niacin  milliGRAM(s) Oral at bedtime  senna 1 Tablet(s) Oral daily  simvastatin 20 milliGRAM(s) Oral at bedtime    MEDICATIONS  (PRN):  acetaminophen   Tablet. 650 milliGRAM(s) Oral every 6 hours PRN Mild Pain (1 - 3)  benzocaine 15 mG/menthol 3.6 mG Lozenge 1 Lozenge Oral every 4 hours PRN Sore Throat  magnesium hydroxide Suspension 30 milliLiter(s) Oral daily PRN Constipation  ondansetron Injectable 4 milliGRAM(s) IV Push once PRN Nausea and/or Vomiting      Physical exam: Right lower extremity- The dressing is clean, dry and intact. Prevena vac dsg is intact, no output in cannister. No wound erythema, discharge, drainage is noted. Thigh compartments soft, NT.  No calf tenderness. Sensation to light touch is grossly intact distally. Motor function distally is 5/5. No foot drop. 2+ dorsalis pedis pulse. Capillary refill is less than 2 seconds. No cyanosis.    Primary Orthopedic Assessment:  • S/P ORIF right femur, POD#3    Plan:   - Prevena vac dsg  • DVT prophylaxis as prescribed- lovenox, including use of compression devices and ankle pumps  • Continue physical therapy  • Non-weight bearing of the right lower extremity  • Pain control as clinically indicated  • Incentive spirometry encouraged  -trauma team management  -MOM prn  • Discharge planning – anticipated discharge is subacute rehabilitation

## 2018-05-05 NOTE — PROGRESS NOTE ADULT - SUBJECTIVE AND OBJECTIVE BOX
INTERVAL HPI/OVERNIGHT EVENTS:    SUBJECTIVE:  No acute events, no complaints overnight. VAC is working well. Awaiting rehab bed  This morning she complains of constipation; Will be prescribed additional bowel reg including enema    MEDICATIONS  (STANDING):  docusate sodium 100 milliGRAM(s) Oral daily  enoxaparin Injectable 30 milliGRAM(s) SubCutaneous every 12 hours  gabapentin Oral Tab/Cap - Peds 300 milliGRAM(s) Oral daily  niacin  milliGRAM(s) Oral at bedtime  polyethylene glycol 3350 17 Gram(s) Oral two times a day  senna 1 Tablet(s) Oral daily  simvastatin 20 milliGRAM(s) Oral at bedtime    MEDICATIONS  (PRN):  acetaminophen   Tablet. 650 milliGRAM(s) Oral every 6 hours PRN Mild Pain (1 - 3)  benzocaine 15 mG/menthol 3.6 mG Lozenge 1 Lozenge Oral every 4 hours PRN Sore Throat  magnesium hydroxide Suspension 30 milliLiter(s) Oral daily PRN Constipation  ondansetron Injectable 4 milliGRAM(s) IV Push once PRN Nausea and/or Vomiting      Vital Signs Last 24 Hrs  T(C): 37.2 (05 May 2018 14:20), Max: 37.7 (04 May 2018 23:53)  T(F): 99 (05 May 2018 14:20), Max: 99.8 (04 May 2018 23:53)  HR: 100 (05 May 2018 14:20) (90 - 107)  BP: 121/65 (05 May 2018 14:20) (121/65 - 156/76)  BP(mean): --  RR: 20 (05 May 2018 14:20) (18 - 20)  SpO2: 97% (05 May 2018 14:20) (97% - 99%)    PE  Gen:  Pulm:  CV:  Abd:  :  Ext:  Vasc:  Neuro:      I&O's Detail    04 May 2018 07:01  -  05 May 2018 07:00  --------------------------------------------------------  IN:    Oral Fluid: 720 mL  Total IN: 720 mL    OUT:  Total OUT: 0 mL    Total NET: 720 mL          LABS:                RADIOLOGY & ADDITIONAL STUDIES: INTERVAL HPI/OVERNIGHT EVENTS:    SUBJECTIVE:  No acute events, no complaints overnight. VAC is working well. Awaiting rehab bed  This morning she complains of constipation; Will be prescribed additional bowel reg including enema    MEDICATIONS  (STANDING):  docusate sodium 100 milliGRAM(s) Oral daily  enoxaparin Injectable 30 milliGRAM(s) SubCutaneous every 12 hours  gabapentin Oral Tab/Cap - Peds 300 milliGRAM(s) Oral daily  niacin  milliGRAM(s) Oral at bedtime  polyethylene glycol 3350 17 Gram(s) Oral two times a day  senna 1 Tablet(s) Oral daily  simvastatin 20 milliGRAM(s) Oral at bedtime    MEDICATIONS  (PRN):  acetaminophen   Tablet. 650 milliGRAM(s) Oral every 6 hours PRN Mild Pain (1 - 3)  benzocaine 15 mG/menthol 3.6 mG Lozenge 1 Lozenge Oral every 4 hours PRN Sore Throat  magnesium hydroxide Suspension 30 milliLiter(s) Oral daily PRN Constipation  ondansetron Injectable 4 milliGRAM(s) IV Push once PRN Nausea and/or Vomiting      Vital Signs Last 24 Hrs  T(C): 37.2 (05 May 2018 14:20), Max: 37.7 (04 May 2018 23:53)  T(F): 99 (05 May 2018 14:20), Max: 99.8 (04 May 2018 23:53)  HR: 100 (05 May 2018 14:20) (90 - 107)  BP: 121/65 (05 May 2018 14:20) (121/65 - 156/76)  BP(mean): --  RR: 20 (05 May 2018 14:20) (18 - 20)  SpO2: 97% (05 May 2018 14:20) (97% - 99%)    PE  NAD  NC/AT  RRR  No respiratory distress  Abd soft, nondistended, nontender, no guarding or rebound. No peritoneal signs.  RLE compartments soft incision c/d/i with prevena vac, motor and sensation intact, 2+ DP pulses  Provena VAC in place, working well  Neuro: CN II-XII intact.    I&O's Detail    04 May 2018 07:01  -  05 May 2018 07:00  --------------------------------------------------------  IN:    Oral Fluid: 720 mL  Total IN: 720 mL    OUT:  Total OUT: 0 mL    Total NET: 720 mL          LABS:                RADIOLOGY & ADDITIONAL STUDIES:

## 2018-05-05 NOTE — PROGRESS NOTE ADULT - ASSESSMENT
73F POD3 s/p ORIF R periprosthetic femur fx doing well  -Stric IOs  -may go outside  -Pain control as clinically indicated  -Incentive spirometry encouraged  Discharge planning – anticipated discharge is subacute rehabilitation   __Reccs Per Ortho:  - Prevena vac dsg  • DVT prophylaxis as prescribed: lovenox + compression devices and ankle pumps  • Continue physical therapy  • Non-weight bearing of the right lower extremity    Pt seen by surgery team this AM. Anticipated transfer to [facility] rehab today

## 2018-05-06 PROCEDURE — 99232 SBSQ HOSP IP/OBS MODERATE 35: CPT

## 2018-05-06 PROCEDURE — 93010 ELECTROCARDIOGRAM REPORT: CPT

## 2018-05-06 PROCEDURE — 99223 1ST HOSP IP/OBS HIGH 75: CPT

## 2018-05-07 ENCOUNTER — APPOINTMENT (OUTPATIENT)
Dept: PHYSICAL MEDICINE AND REHAB | Facility: CLINIC | Age: 73
End: 2018-05-07

## 2018-05-07 PROCEDURE — 99232 SBSQ HOSP IP/OBS MODERATE 35: CPT | Mod: AI

## 2018-05-07 PROCEDURE — 76700 US EXAM ABDOM COMPLETE: CPT | Mod: 26

## 2018-05-07 PROCEDURE — 99233 SBSQ HOSP IP/OBS HIGH 50: CPT

## 2018-05-08 PROCEDURE — 99232 SBSQ HOSP IP/OBS MODERATE 35: CPT

## 2018-05-09 ENCOUNTER — OTHER (OUTPATIENT)
Age: 73
End: 2018-05-09

## 2018-05-09 PROCEDURE — 99233 SBSQ HOSP IP/OBS HIGH 50: CPT

## 2018-05-09 PROCEDURE — 93971 EXTREMITY STUDY: CPT | Mod: 26,RT

## 2018-05-10 PROCEDURE — 99232 SBSQ HOSP IP/OBS MODERATE 35: CPT

## 2018-05-11 PROCEDURE — 99233 SBSQ HOSP IP/OBS HIGH 50: CPT

## 2018-05-11 PROCEDURE — 99232 SBSQ HOSP IP/OBS MODERATE 35: CPT

## 2018-05-12 PROCEDURE — 99232 SBSQ HOSP IP/OBS MODERATE 35: CPT

## 2018-05-13 PROCEDURE — 99232 SBSQ HOSP IP/OBS MODERATE 35: CPT

## 2018-05-14 ENCOUNTER — OTHER (OUTPATIENT)
Age: 73
End: 2018-05-14

## 2018-05-14 PROCEDURE — 99232 SBSQ HOSP IP/OBS MODERATE 35: CPT | Mod: GC

## 2018-05-14 PROCEDURE — 99232 SBSQ HOSP IP/OBS MODERATE 35: CPT

## 2018-05-15 PROCEDURE — 99232 SBSQ HOSP IP/OBS MODERATE 35: CPT

## 2018-05-16 PROCEDURE — 99232 SBSQ HOSP IP/OBS MODERATE 35: CPT

## 2018-05-17 PROCEDURE — 99238 HOSP IP/OBS DSCHRG MGMT 30/<: CPT

## 2018-05-23 ENCOUNTER — APPOINTMENT (OUTPATIENT)
Dept: ORTHOPEDIC SURGERY | Facility: CLINIC | Age: 73
End: 2018-05-23
Payer: MEDICARE

## 2018-05-23 VITALS
WEIGHT: 150 LBS | HEIGHT: 65 IN | DIASTOLIC BLOOD PRESSURE: 76 MMHG | BODY MASS INDEX: 24.99 KG/M2 | HEART RATE: 103 BPM | SYSTOLIC BLOOD PRESSURE: 128 MMHG

## 2018-05-23 PROCEDURE — 99024 POSTOP FOLLOW-UP VISIT: CPT

## 2018-05-23 PROCEDURE — 73552 X-RAY EXAM OF FEMUR 2/>: CPT | Mod: RT

## 2018-05-26 DIAGNOSIS — M97.01XD PERIPROSTHETIC FRACTURE AROUND INTERNAL PROSTHETIC RIGHT HIP JOINT, SUBSEQUENT ENCOUNTER: ICD-10-CM

## 2018-05-26 DIAGNOSIS — G89.29 OTHER CHRONIC PAIN: ICD-10-CM

## 2018-05-26 DIAGNOSIS — Z47.89 ENCOUNTER FOR OTHER ORTHOPEDIC AFTERCARE: ICD-10-CM

## 2018-05-26 DIAGNOSIS — K21.9 GASTRO-ESOPHAGEAL REFLUX DISEASE WITHOUT ESOPHAGITIS: ICD-10-CM

## 2018-05-26 DIAGNOSIS — D47.3 ESSENTIAL (HEMORRHAGIC) THROMBOCYTHEMIA: ICD-10-CM

## 2018-05-26 DIAGNOSIS — R26.9 UNSPECIFIED ABNORMALITIES OF GAIT AND MOBILITY: ICD-10-CM

## 2018-05-26 DIAGNOSIS — Z91.81 HISTORY OF FALLING: ICD-10-CM

## 2018-05-26 DIAGNOSIS — S72.91XD UNSPECIFIED FRACTURE OF RIGHT FEMUR, SUBSEQUENT ENCOUNTER FOR CLOSED FRACTURE WITH ROUTINE HEALING: ICD-10-CM

## 2018-05-26 DIAGNOSIS — R60.0 LOCALIZED EDEMA: ICD-10-CM

## 2018-05-26 DIAGNOSIS — D64.9 ANEMIA, UNSPECIFIED: ICD-10-CM

## 2018-05-26 DIAGNOSIS — M54.9 DORSALGIA, UNSPECIFIED: ICD-10-CM

## 2018-05-26 DIAGNOSIS — R79.89 OTHER SPECIFIED ABNORMAL FINDINGS OF BLOOD CHEMISTRY: ICD-10-CM

## 2018-05-26 DIAGNOSIS — Z51.89 ENCOUNTER FOR OTHER SPECIFIED AFTERCARE: ICD-10-CM

## 2018-05-26 DIAGNOSIS — F17.200 NICOTINE DEPENDENCE, UNSPECIFIED, UNCOMPLICATED: ICD-10-CM

## 2018-05-26 DIAGNOSIS — D72.829 ELEVATED WHITE BLOOD CELL COUNT, UNSPECIFIED: ICD-10-CM

## 2018-05-26 PROCEDURE — 97535 SELF CARE MNGMENT TRAINING: CPT

## 2018-05-26 PROCEDURE — 97167 OT EVAL HIGH COMPLEX 60 MIN: CPT

## 2018-05-26 PROCEDURE — 97116 GAIT TRAINING THERAPY: CPT

## 2018-05-26 PROCEDURE — 97110 THERAPEUTIC EXERCISES: CPT

## 2018-05-26 PROCEDURE — 81001 URINALYSIS AUTO W/SCOPE: CPT

## 2018-05-26 PROCEDURE — 82728 ASSAY OF FERRITIN: CPT

## 2018-05-26 PROCEDURE — 93005 ELECTROCARDIOGRAM TRACING: CPT

## 2018-05-26 PROCEDURE — 83735 ASSAY OF MAGNESIUM: CPT

## 2018-05-26 PROCEDURE — 97530 THERAPEUTIC ACTIVITIES: CPT

## 2018-05-26 PROCEDURE — 84100 ASSAY OF PHOSPHORUS: CPT

## 2018-05-26 PROCEDURE — 97163 PT EVAL HIGH COMPLEX 45 MIN: CPT

## 2018-05-26 PROCEDURE — 82306 VITAMIN D 25 HYDROXY: CPT

## 2018-05-26 PROCEDURE — 93971 EXTREMITY STUDY: CPT

## 2018-05-26 PROCEDURE — 76700 US EXAM ABDOM COMPLETE: CPT

## 2018-05-26 PROCEDURE — 80048 BASIC METABOLIC PNL TOTAL CA: CPT

## 2018-05-26 PROCEDURE — 85025 COMPLETE CBC W/AUTO DIFF WBC: CPT

## 2018-05-26 PROCEDURE — 80053 COMPREHEN METABOLIC PANEL: CPT

## 2018-05-26 PROCEDURE — 85027 COMPLETE CBC AUTOMATED: CPT

## 2018-05-29 ENCOUNTER — OUTPATIENT (OUTPATIENT)
Dept: OUTPATIENT SERVICES | Facility: HOSPITAL | Age: 73
LOS: 1 days | End: 2018-05-29
Payer: MEDICARE

## 2018-05-29 DIAGNOSIS — R27.9 UNSPECIFIED LACK OF COORDINATION: ICD-10-CM

## 2018-05-29 DIAGNOSIS — R26.89 OTHER ABNORMALITIES OF GAIT AND MOBILITY: ICD-10-CM

## 2018-05-29 DIAGNOSIS — Z98.89 OTHER SPECIFIED POSTPROCEDURAL STATES: Chronic | ICD-10-CM

## 2018-05-29 DIAGNOSIS — Z51.89 ENCOUNTER FOR OTHER SPECIFIED AFTERCARE: ICD-10-CM

## 2018-05-29 DIAGNOSIS — M25.569 PAIN IN UNSPECIFIED KNEE: Chronic | ICD-10-CM

## 2018-05-29 DIAGNOSIS — M47.9 SPONDYLOSIS, UNSPECIFIED: Chronic | ICD-10-CM

## 2018-05-29 DIAGNOSIS — K38.9 DISEASE OF APPENDIX, UNSPECIFIED: Chronic | ICD-10-CM

## 2018-05-29 DIAGNOSIS — S72.91XD UNSPECIFIED FRACTURE OF RIGHT FEMUR, SUBSEQUENT ENCOUNTER FOR CLOSED FRACTURE WITH ROUTINE HEALING: ICD-10-CM

## 2018-05-29 DIAGNOSIS — N83.20 UNSPECIFIED OVARIAN CYSTS: Chronic | ICD-10-CM

## 2018-05-29 DIAGNOSIS — M25.561 PAIN IN RIGHT KNEE: Chronic | ICD-10-CM

## 2018-05-29 DIAGNOSIS — T85.118A: Chronic | ICD-10-CM

## 2018-05-31 ENCOUNTER — OTHER (OUTPATIENT)
Age: 73
End: 2018-05-31

## 2018-06-09 PROCEDURE — 85610 PROTHROMBIN TIME: CPT

## 2018-06-09 PROCEDURE — 86850 RBC ANTIBODY SCREEN: CPT

## 2018-06-09 PROCEDURE — 97530 THERAPEUTIC ACTIVITIES: CPT

## 2018-06-09 PROCEDURE — 36430 TRANSFUSION BLD/BLD COMPNT: CPT

## 2018-06-09 PROCEDURE — 86923 COMPATIBILITY TEST ELECTRIC: CPT

## 2018-06-09 PROCEDURE — 73502 X-RAY EXAM HIP UNI 2-3 VIEWS: CPT

## 2018-06-09 PROCEDURE — C1713: CPT

## 2018-06-09 PROCEDURE — 71045 X-RAY EXAM CHEST 1 VIEW: CPT

## 2018-06-09 PROCEDURE — 73552 X-RAY EXAM OF FEMUR 2/>: CPT

## 2018-06-09 PROCEDURE — C1889: CPT

## 2018-06-09 PROCEDURE — 80048 BASIC METABOLIC PNL TOTAL CA: CPT

## 2018-06-09 PROCEDURE — 76000 FLUOROSCOPY <1 HR PHYS/QHP: CPT

## 2018-06-09 PROCEDURE — 86900 BLOOD TYPING SEROLOGIC ABO: CPT

## 2018-06-09 PROCEDURE — 80053 COMPREHEN METABOLIC PANEL: CPT

## 2018-06-09 PROCEDURE — 97110 THERAPEUTIC EXERCISES: CPT

## 2018-06-09 PROCEDURE — 93005 ELECTROCARDIOGRAM TRACING: CPT

## 2018-06-09 PROCEDURE — 84100 ASSAY OF PHOSPHORUS: CPT

## 2018-06-09 PROCEDURE — 99285 EMERGENCY DEPT VISIT HI MDM: CPT

## 2018-06-09 PROCEDURE — C1769: CPT

## 2018-06-09 PROCEDURE — 36415 COLL VENOUS BLD VENIPUNCTURE: CPT

## 2018-06-09 PROCEDURE — 85027 COMPLETE CBC AUTOMATED: CPT

## 2018-06-09 PROCEDURE — 83735 ASSAY OF MAGNESIUM: CPT

## 2018-06-09 PROCEDURE — 97116 GAIT TRAINING THERAPY: CPT

## 2018-06-09 PROCEDURE — 97163 PT EVAL HIGH COMPLEX 45 MIN: CPT

## 2018-06-09 PROCEDURE — 86901 BLOOD TYPING SEROLOGIC RH(D): CPT

## 2018-06-09 PROCEDURE — P9016: CPT

## 2018-06-20 ENCOUNTER — APPOINTMENT (OUTPATIENT)
Dept: ORTHOPEDIC SURGERY | Facility: CLINIC | Age: 73
End: 2018-06-20
Payer: MEDICARE

## 2018-06-20 VITALS
HEART RATE: 101 BPM | DIASTOLIC BLOOD PRESSURE: 73 MMHG | BODY MASS INDEX: 24.99 KG/M2 | WEIGHT: 150 LBS | SYSTOLIC BLOOD PRESSURE: 131 MMHG | HEIGHT: 65 IN

## 2018-06-20 PROCEDURE — 99024 POSTOP FOLLOW-UP VISIT: CPT

## 2018-06-20 PROCEDURE — 73552 X-RAY EXAM OF FEMUR 2/>: CPT | Mod: RT

## 2018-06-25 ENCOUNTER — APPOINTMENT (OUTPATIENT)
Dept: ORTHOPEDIC SURGERY | Facility: CLINIC | Age: 73
End: 2018-06-25
Payer: MEDICARE

## 2018-06-25 VITALS
DIASTOLIC BLOOD PRESSURE: 63 MMHG | WEIGHT: 150 LBS | HEART RATE: 90 BPM | BODY MASS INDEX: 24.99 KG/M2 | HEIGHT: 65 IN | SYSTOLIC BLOOD PRESSURE: 130 MMHG

## 2018-06-25 PROCEDURE — 99024 POSTOP FOLLOW-UP VISIT: CPT

## 2018-06-27 ENCOUNTER — APPOINTMENT (OUTPATIENT)
Dept: ORTHOPEDIC SURGERY | Facility: CLINIC | Age: 73
End: 2018-06-27
Payer: MEDICARE

## 2018-06-27 VITALS
HEIGHT: 65 IN | DIASTOLIC BLOOD PRESSURE: 76 MMHG | BODY MASS INDEX: 24.99 KG/M2 | WEIGHT: 150 LBS | SYSTOLIC BLOOD PRESSURE: 143 MMHG | HEART RATE: 100 BPM

## 2018-06-27 PROCEDURE — 99024 POSTOP FOLLOW-UP VISIT: CPT

## 2018-06-28 ENCOUNTER — APPOINTMENT (OUTPATIENT)
Dept: ORTHOPEDIC SURGERY | Facility: CLINIC | Age: 73
End: 2018-06-28
Payer: MEDICARE

## 2018-06-28 DIAGNOSIS — M79.604 PAIN IN RIGHT LEG: ICD-10-CM

## 2018-06-28 PROCEDURE — 73590 X-RAY EXAM OF LOWER LEG: CPT | Mod: RT

## 2018-06-28 PROCEDURE — 99024 POSTOP FOLLOW-UP VISIT: CPT

## 2018-06-28 PROCEDURE — 73552 X-RAY EXAM OF FEMUR 2/>: CPT | Mod: RT

## 2018-07-02 ENCOUNTER — APPOINTMENT (OUTPATIENT)
Dept: PHYSICAL MEDICINE AND REHAB | Facility: CLINIC | Age: 73
End: 2018-07-02
Payer: MEDICARE

## 2018-07-02 VITALS
DIASTOLIC BLOOD PRESSURE: 66 MMHG | SYSTOLIC BLOOD PRESSURE: 134 MMHG | HEIGHT: 65 IN | BODY MASS INDEX: 24.99 KG/M2 | HEART RATE: 101 BPM | WEIGHT: 150 LBS

## 2018-07-02 PROCEDURE — 99213 OFFICE O/P EST LOW 20 MIN: CPT

## 2018-07-02 RX ORDER — ENOXAPARIN SODIUM 100 MG/ML
30 INJECTION SUBCUTANEOUS
Qty: 8 | Refills: 0 | Status: COMPLETED | COMMUNITY
Start: 2018-05-28

## 2018-07-02 RX ORDER — POLYETHYLENE GLYCOL 3350 17 G/17G
17 POWDER, FOR SOLUTION ORAL
Qty: 1581 | Refills: 0 | Status: ACTIVE | COMMUNITY
Start: 2018-04-16

## 2018-07-02 RX ORDER — GABAPENTIN 300 MG/1
300 CAPSULE ORAL TWICE DAILY
Qty: 60 | Refills: 3 | Status: ACTIVE | COMMUNITY
Start: 2017-11-20 | End: 1900-01-01

## 2018-07-02 RX ORDER — METHYLPREDNISOLONE 4 MG/1
4 TABLET ORAL
Qty: 21 | Refills: 0 | Status: COMPLETED | COMMUNITY
Start: 2018-01-07

## 2018-07-16 ENCOUNTER — APPOINTMENT (OUTPATIENT)
Dept: PHYSICAL MEDICINE AND REHAB | Facility: CLINIC | Age: 73
End: 2018-07-16
Payer: OTHER MISCELLANEOUS

## 2018-07-16 VITALS
BODY MASS INDEX: 24.99 KG/M2 | WEIGHT: 150 LBS | HEART RATE: 92 BPM | HEIGHT: 65 IN | SYSTOLIC BLOOD PRESSURE: 165 MMHG | DIASTOLIC BLOOD PRESSURE: 69 MMHG

## 2018-07-16 DIAGNOSIS — G57.90 UNSPECIFIED MONONEUROPATHY OF UNSPECIFIED LOWER LIMB: ICD-10-CM

## 2018-07-16 DIAGNOSIS — G82.20 PARAPLEGIA, UNSPECIFIED: ICD-10-CM

## 2018-07-16 DIAGNOSIS — M54.16 RADICULOPATHY, LUMBAR REGION: ICD-10-CM

## 2018-07-16 DIAGNOSIS — G83.4 CAUDA EQUINA SYNDROME: ICD-10-CM

## 2018-07-16 PROCEDURE — 99213 OFFICE O/P EST LOW 20 MIN: CPT

## 2018-08-08 ENCOUNTER — APPOINTMENT (OUTPATIENT)
Dept: ORTHOPEDIC SURGERY | Facility: CLINIC | Age: 73
End: 2018-08-08
Payer: MEDICARE

## 2018-08-08 VITALS
DIASTOLIC BLOOD PRESSURE: 73 MMHG | HEART RATE: 94 BPM | BODY MASS INDEX: 24.99 KG/M2 | HEIGHT: 65 IN | WEIGHT: 150 LBS | SYSTOLIC BLOOD PRESSURE: 157 MMHG

## 2018-08-08 PROCEDURE — 99214 OFFICE O/P EST MOD 30 MIN: CPT

## 2018-08-08 PROCEDURE — 73552 X-RAY EXAM OF FEMUR 2/>: CPT | Mod: RT

## 2018-09-14 PROCEDURE — 97110 THERAPEUTIC EXERCISES: CPT

## 2018-09-14 PROCEDURE — 97112 NEUROMUSCULAR REEDUCATION: CPT | Mod: KX

## 2018-09-14 PROCEDURE — 97163 PT EVAL HIGH COMPLEX 45 MIN: CPT

## 2018-09-14 PROCEDURE — G8980: CPT | Mod: CK

## 2018-09-14 PROCEDURE — 97116 GAIT TRAINING THERAPY: CPT | Mod: KX

## 2018-09-14 PROCEDURE — G8978: CPT | Mod: GP,CK

## 2018-09-14 PROCEDURE — G8979: CPT | Mod: CK

## 2018-09-26 ENCOUNTER — OTHER (OUTPATIENT)
Age: 73
End: 2018-09-26

## 2018-10-04 NOTE — DISCHARGE NOTE ADULT - REASON FOR ADMISSION
"Anesthesia Release from PACU Note    Patient: Carolee Ortega    Procedure(s) Performed: Procedure(s) (LRB):  RELEASE, CARPAL TUNNEL (Right)  RELEASE, TRIGGER FINGER (Right)    Anesthesia type: general    Post pain: Adequate analgesia    Post assessment: no apparent anesthetic complications    Last Vitals:   Visit Vitals  BP (!) 150/82 (BP Location: Right arm, Patient Position: Lying)   Pulse (!) 52   Temp 36.7 °C (98.1 °F) (Skin)   Resp 16   Ht 5' 2" (1.575 m)   Wt 97.9 kg (215 lb 13.3 oz)   SpO2 98%   Breastfeeding? No   BMI 39.48 kg/m²       Post vital signs: stable    Level of consciousness: awake    Nausea/Vomiting: no nausea/no vomiting    Complications: none    Airway Patency: patent    Respiratory: unassisted    Cardiovascular: stable and blood pressure at baseline    Hydration: euvolemic  "
R periprosthetic fracture

## 2018-10-26 ENCOUNTER — APPOINTMENT (OUTPATIENT)
Dept: ORTHOPEDIC SURGERY | Facility: CLINIC | Age: 73
End: 2018-10-26

## 2018-11-06 ENCOUNTER — OUTPATIENT (OUTPATIENT)
Dept: OUTPATIENT SERVICES | Facility: HOSPITAL | Age: 73
LOS: 1 days | End: 2018-11-06
Payer: COMMERCIAL

## 2018-11-06 DIAGNOSIS — Z51.89 ENCOUNTER FOR OTHER SPECIFIED AFTERCARE: ICD-10-CM

## 2018-11-06 DIAGNOSIS — K38.9 DISEASE OF APPENDIX, UNSPECIFIED: Chronic | ICD-10-CM

## 2018-11-06 DIAGNOSIS — M25.561 PAIN IN RIGHT KNEE: Chronic | ICD-10-CM

## 2018-11-06 DIAGNOSIS — M47.9 SPONDYLOSIS, UNSPECIFIED: Chronic | ICD-10-CM

## 2018-11-06 DIAGNOSIS — M25.569 PAIN IN UNSPECIFIED KNEE: Chronic | ICD-10-CM

## 2018-11-06 DIAGNOSIS — Z98.89 OTHER SPECIFIED POSTPROCEDURAL STATES: Chronic | ICD-10-CM

## 2018-11-06 DIAGNOSIS — N83.20 UNSPECIFIED OVARIAN CYSTS: Chronic | ICD-10-CM

## 2018-11-06 DIAGNOSIS — T85.118A: Chronic | ICD-10-CM

## 2018-11-19 PROCEDURE — 97163 PT EVAL HIGH COMPLEX 45 MIN: CPT

## 2018-11-19 PROCEDURE — 97110 THERAPEUTIC EXERCISES: CPT

## 2019-01-30 ENCOUNTER — RX RENEWAL (OUTPATIENT)
Age: 74
End: 2019-01-30

## 2019-04-08 ENCOUNTER — APPOINTMENT (OUTPATIENT)
Dept: ORTHOPEDIC SURGERY | Facility: CLINIC | Age: 74
End: 2019-04-08
Payer: MEDICARE

## 2019-04-08 VITALS
HEIGHT: 65 IN | BODY MASS INDEX: 24.99 KG/M2 | HEART RATE: 112 BPM | SYSTOLIC BLOOD PRESSURE: 156 MMHG | WEIGHT: 150 LBS | DIASTOLIC BLOOD PRESSURE: 75 MMHG

## 2019-04-08 PROCEDURE — 73552 X-RAY EXAM OF FEMUR 2/>: CPT | Mod: RT

## 2019-04-08 PROCEDURE — 99214 OFFICE O/P EST MOD 30 MIN: CPT

## 2019-04-08 NOTE — HISTORY OF PRESENT ILLNESS
[de-identified] : The patient is a pleasant 74-year-old female who returns today after ORIF of a periprosthetic right femoral shaft fracture. She has been doing well since discharge from the hospital. She has cauda equina at baseline and has limited motion of her legs. No pain currently. Current symptoms include no chills, no constipation, no diarrhea, no dysuria, no fever, no nausea and no vomiting. She saw Dr. Allen who was concerned about her gait and toe walking.\par \par

## 2019-04-08 NOTE — DISCUSSION/SUMMARY
[de-identified] : 74-year-old female s/p ORIF of a periprosthetic right femoral shaft fracture. Postoperatively, the patient is doing well, has excellent pain control and is showing no signs of infection. \par \par We will continue with standard postoperative plan. Full unrestricted weightbearing. Full range of motion. I would clear her from the femur fracture standpoint and recommend she continue care for her spinal cord injury. Her toe walking maybe behavioral or a symptom of the cord injury. Recommend additional PT. As she is doing so well, she may follow up PRN. The patient was given the opportunity to ask questions and all questions were answered to their satisfaction.\par \par Tomy Brunner MD\par Orthopaedic Trauma Surgeon\par [facility]\par [facility] [place]\par \par

## 2019-04-08 NOTE — PHYSICAL EXAM
[de-identified] : Physical Exam:\par General: Well appearing, no acute distress, A&O\par Neurologic: A&Ox3, No focal deficits\par Head: NCAT without abrasions, lacerations, or ecchymosis to head, face, or scalp\par Eyes: No scleral icterus, no gross abnormalities\par Respiratory: Equal chest wall expansion bilaterally, no accessory muscle use\par Lymphatic: No lymphadenopathy palpated\par Skin: Warm and dry\par Psychiatric: Normal mood and affect\par \par BLE: chronic weakness and numbness from previous spinal cord injury\par brisk cap refill\par skin c/d/i\par no TTP. The surgical incision site(s) was clean, dry and intact, healed, not erythematous, absent of discharge, not swollen and not dehisced. \par \par Ankle ROM: neutral to 30 plantar flexion, mild spasticity noted in heel cord [de-identified] : plain films of the right femur were obtained today. no change in alignment of femur fracture. good mature callus formation.

## 2019-04-12 ENCOUNTER — OUTPATIENT (OUTPATIENT)
Dept: OUTPATIENT SERVICES | Facility: HOSPITAL | Age: 74
LOS: 1 days | End: 2019-04-12
Payer: MEDICARE

## 2019-04-12 DIAGNOSIS — R27.9 UNSPECIFIED LACK OF COORDINATION: ICD-10-CM

## 2019-04-12 DIAGNOSIS — R26.0 ATAXIC GAIT: ICD-10-CM

## 2019-04-12 DIAGNOSIS — M25.569 PAIN IN UNSPECIFIED KNEE: Chronic | ICD-10-CM

## 2019-04-12 DIAGNOSIS — M25.561 PAIN IN RIGHT KNEE: Chronic | ICD-10-CM

## 2019-04-12 DIAGNOSIS — M47.9 SPONDYLOSIS, UNSPECIFIED: Chronic | ICD-10-CM

## 2019-04-12 DIAGNOSIS — N83.20 UNSPECIFIED OVARIAN CYSTS: Chronic | ICD-10-CM

## 2019-04-12 DIAGNOSIS — K38.9 DISEASE OF APPENDIX, UNSPECIFIED: Chronic | ICD-10-CM

## 2019-04-12 DIAGNOSIS — S72.91XD UNSPECIFIED FRACTURE OF RIGHT FEMUR, SUBSEQUENT ENCOUNTER FOR CLOSED FRACTURE WITH ROUTINE HEALING: ICD-10-CM

## 2019-04-12 DIAGNOSIS — R26.2 DIFFICULTY IN WALKING, NOT ELSEWHERE CLASSIFIED: ICD-10-CM

## 2019-04-12 DIAGNOSIS — T85.118A: Chronic | ICD-10-CM

## 2019-04-12 DIAGNOSIS — G83.4 CAUDA EQUINA SYNDROME: ICD-10-CM

## 2019-04-12 DIAGNOSIS — Z51.89 ENCOUNTER FOR OTHER SPECIFIED AFTERCARE: ICD-10-CM

## 2019-04-12 DIAGNOSIS — Z98.89 OTHER SPECIFIED POSTPROCEDURAL STATES: Chronic | ICD-10-CM

## 2019-06-28 PROCEDURE — 97110 THERAPEUTIC EXERCISES: CPT

## 2019-06-28 PROCEDURE — 97163 PT EVAL HIGH COMPLEX 45 MIN: CPT

## 2019-06-28 PROCEDURE — 97140 MANUAL THERAPY 1/> REGIONS: CPT

## 2019-06-28 PROCEDURE — 97112 NEUROMUSCULAR REEDUCATION: CPT

## 2019-06-28 PROCEDURE — 97116 GAIT TRAINING THERAPY: CPT

## 2019-06-28 NOTE — PRE-OP CHECKLIST - BP NONINVASIVE SYSTOLIC (MM HG)
[de-identified] : Right shoulder/neck exam\par \par Inspection: No swelling, ecchymosis or gross deformity.\par Skin: No masses, No lesions\par Neck: Negative Spurlings, full ROM without pain\par Tenderness: No bicipital tenderness, no tenderness to the greater tuberosity/RTC insertion, no anterior shoulder/lesser tuberosity tenderness. No tenderness SC joint, clavicle, AC joint.\par ROM: 160/60/T6\par Impingement tests: Positive Back\par AC Joint: no pain with cross arm testing\par Biceps: Negative speed\par Strength: 5-/5 abduction, 5/5 external rotation, and internal rotation\par Neuro: AIN, PIN, Ulnar nerve motor intact\par Sensation: Intact to light touch in radial, median, ulnar, and axillary nerve distributions\par Vasc: 2+ radial pulse\par  [de-identified] : MRI right shoulder reviewed. Full-thickness rotator cuff tear. Os acromiale. Biceps tear\par \par  116

## 2019-09-11 ENCOUNTER — OTHER (OUTPATIENT)
Age: 74
End: 2019-09-11

## 2019-10-06 NOTE — PATIENT PROFILE ADULT. - SOCIAL CONCERNS
· Will monitor in the ICU  · Continue Cardizem drip and titrate as tolerated  · Will check echo  · Troponin negative  · Cardiology consult None

## 2019-11-04 ENCOUNTER — OTHER (OUTPATIENT)
Age: 74
End: 2019-11-04

## 2021-03-22 NOTE — PATIENT PROFILE ADULT. - MENTAL HEALTH CONDITIONS/SYMPTOMS, PROFILE
Maintain a 2 gram daily sodium diet and 1500 ml daily fluid restriction  Check daily weights  If you gain 3 pounds in one day, 5 pounds in one week, or experience worsening shortness of breath or increasing lower leg swelling  Please call the heart failure office at [phone]       BMP with next lab study anxiety disorder

## 2021-06-03 ENCOUNTER — RESULT REVIEW (OUTPATIENT)
Age: 76
End: 2021-06-03

## 2021-08-04 ENCOUNTER — APPOINTMENT (OUTPATIENT)
Dept: ORTHOPEDIC SURGERY | Facility: CLINIC | Age: 76
End: 2021-08-04
Payer: MEDICARE

## 2021-08-04 VITALS
WEIGHT: 142 LBS | BODY MASS INDEX: 23.66 KG/M2 | SYSTOLIC BLOOD PRESSURE: 127 MMHG | DIASTOLIC BLOOD PRESSURE: 74 MMHG | HEIGHT: 65 IN | TEMPERATURE: 97.8 F | HEART RATE: 105 BPM

## 2021-08-04 PROCEDURE — 99214 OFFICE O/P EST MOD 30 MIN: CPT

## 2021-08-04 NOTE — PHYSICAL EXAM
[de-identified] : Physical Exam:\par General: Well appearing, no acute distress, A&O\par Neurologic: A&Ox3, No focal deficits\par Head: NCAT without abrasions, lacerations, or ecchymosis to head, face, or scalp\par Eyes: No scleral icterus, no gross abnormalities\par Respiratory: Equal chest wall expansion bilaterally, no accessory muscle use\par Lymphatic: No lymphadenopathy palpated\par Skin: Warm and dry\par Psychiatric: Normal mood and affect\par \par BLE: chronic weakness and numbness from previous spinal cord injury\par brisk cap refill\par skin c/d/i\par no TTP. The surgical incision site(s) was clean, dry and intact, healed, not erythematous, absent of discharge, not swollen and not dehisced. \par \par Ankle ROM: neutral to 30 plantar flexion, mild spasticity noted in heel cord

## 2021-08-04 NOTE — DISCUSSION/SUMMARY
[de-identified] : 76-year-old female s/p ORIF of a periprosthetic right femoral shaft fracture. Postoperatively, the patient is doing well, has excellent pain control and is showing no signs of infection. \par \par We will continue with standard postoperative plan. Full unrestricted weightbearing. Full range of motion. I would clear her from the femur fracture standpoint and recommend she continue care for her spinal cord injury. Her toe walking maybe behavioral or a symptom of the cord injury. Recommend additional PT. As she is doing so well, she may follow up PRN. The patient was given the opportunity to ask questions and all questions were answered to their satisfaction.\par \par Tomy Brunner MD\par Orthopaedic Trauma Surgeon\par [facility]\par [facility] [place]\par \par

## 2021-08-04 NOTE — HISTORY OF PRESENT ILLNESS
[de-identified] : The patient is a pleasant 76-year-old female who returns today after ORIF of a periprosthetic right femoral shaft fracture. She has been doing well since her last visit. She has cauda equina at baseline and has limited motion of her legs. No pain currently. Current symptoms include no chills, no constipation, no diarrhea, no dysuria, no fever, no nausea and no vomiting. She saw Dr. Allen who was concerned about her gait and toe walking.\par \par  [0] : a current pain level of 0/10 [Bending] : worsened by bending [Lifting] : worsened by lifting [Weight Bearing] : worsened by weight bearing [Recumbency] : relieved by recumbency [Rest] : relieved by rest

## 2021-08-16 ENCOUNTER — OUTPATIENT (OUTPATIENT)
Dept: OUTPATIENT SERVICES | Facility: HOSPITAL | Age: 76
LOS: 1 days | End: 2021-08-16
Payer: MEDICARE

## 2021-08-16 ENCOUNTER — APPOINTMENT (OUTPATIENT)
Dept: MRI IMAGING | Facility: CLINIC | Age: 76
End: 2021-08-16
Payer: MEDICARE

## 2021-08-16 DIAGNOSIS — K38.9 DISEASE OF APPENDIX, UNSPECIFIED: Chronic | ICD-10-CM

## 2021-08-16 DIAGNOSIS — N83.20 UNSPECIFIED OVARIAN CYSTS: Chronic | ICD-10-CM

## 2021-08-16 DIAGNOSIS — Z98.89 OTHER SPECIFIED POSTPROCEDURAL STATES: Chronic | ICD-10-CM

## 2021-08-16 DIAGNOSIS — T85.118A: Chronic | ICD-10-CM

## 2021-08-16 DIAGNOSIS — M25.569 PAIN IN UNSPECIFIED KNEE: Chronic | ICD-10-CM

## 2021-08-16 DIAGNOSIS — Z00.8 ENCOUNTER FOR OTHER GENERAL EXAMINATION: ICD-10-CM

## 2021-08-16 DIAGNOSIS — M25.561 PAIN IN RIGHT KNEE: Chronic | ICD-10-CM

## 2021-08-16 DIAGNOSIS — M47.9 SPONDYLOSIS, UNSPECIFIED: Chronic | ICD-10-CM

## 2021-08-16 PROCEDURE — A9585: CPT

## 2021-08-16 PROCEDURE — 73723 MRI JOINT LWR EXTR W/O&W/DYE: CPT | Mod: 26,RT,MH

## 2021-08-16 PROCEDURE — 73723 MRI JOINT LWR EXTR W/O&W/DYE: CPT | Mod: MH

## 2021-09-15 NOTE — H&P ADULT - NSHPSOCIALHISTORY_GEN_ALL_CORE
RHEUMATOLOGY    Dr. Richard Mcfarlane    [facility] [place]  [address] [place] [address]  [place] [address]  Phone number: [phone]  Fax number: [phone]    Thank you for choosing [facility]!    Noy Burt CMA Rheumatology    --------------    A single additional dose of the Pfizer or Moderna COVID-19 vaccine is recommended at least 28 days after the completion of the 2-dose mRNA vaccine series for patients receiving any immunosuppressive or immunomodulatory therapy. Attempts should be made to match the additional mRNA dose type to the type given in the mRNA primary series; however, if that is not feasible, a booster dose with the alternative mRNA vaccine is permitted.    Based on our current understanding (and this may change over time as we learn more), medications should be adjusted as noted below, if disease activity allows:  - NSAIDs and Acetaminophen: hold for 24 hours prior to vaccination if able to do so      ---------------------    TDAP and influenza are needed.  The influenza vaccine needs               
Denies tobacco, ETOH or drug misuse

## 2022-03-08 ENCOUNTER — APPOINTMENT (OUTPATIENT)
Dept: ORTHOPEDIC SURGERY | Facility: CLINIC | Age: 77
End: 2022-03-08
Payer: MEDICARE

## 2022-03-08 ENCOUNTER — NON-APPOINTMENT (OUTPATIENT)
Age: 77
End: 2022-03-08

## 2022-03-08 DIAGNOSIS — S72.91XD UNSPECIFIED FRACTURE OF RIGHT FEMUR, SUBSEQUENT ENCOUNTER FOR CLOSED FRACTURE WITH ROUTINE HEALING: ICD-10-CM

## 2022-03-08 PROCEDURE — 99442: CPT | Mod: 95

## 2022-03-09 PROBLEM — S72.91XD FRACTURE OF RIGHT FEMUR WITH ROUTINE HEALING: Status: ACTIVE | Noted: 2018-05-14

## 2022-03-23 ENCOUNTER — APPOINTMENT (OUTPATIENT)
Dept: ORTHOPEDIC SURGERY | Facility: CLINIC | Age: 77
End: 2022-03-23

## 2022-04-27 ENCOUNTER — APPOINTMENT (OUTPATIENT)
Dept: ORTHOPEDIC SURGERY | Facility: CLINIC | Age: 77
End: 2022-04-27

## 2022-06-08 ENCOUNTER — APPOINTMENT (OUTPATIENT)
Dept: ORTHOPEDIC SURGERY | Facility: CLINIC | Age: 77
End: 2022-06-08

## 2022-08-15 ENCOUNTER — OFFICE (OUTPATIENT)
Dept: URBAN - METROPOLITAN AREA CLINIC 12 | Facility: CLINIC | Age: 77
Setting detail: OPHTHALMOLOGY
End: 2022-08-15
Payer: MEDICARE

## 2022-08-15 ENCOUNTER — RX ONLY (RX ONLY)
Age: 77
End: 2022-08-15

## 2022-08-15 DIAGNOSIS — H00.14: ICD-10-CM

## 2022-08-15 DIAGNOSIS — H01.001: ICD-10-CM

## 2022-08-15 DIAGNOSIS — H01.005: ICD-10-CM

## 2022-08-15 DIAGNOSIS — H01.004: ICD-10-CM

## 2022-08-15 DIAGNOSIS — H01.002: ICD-10-CM

## 2022-08-15 DIAGNOSIS — H40.033: ICD-10-CM

## 2022-08-15 PROCEDURE — 92002 INTRM OPH EXAM NEW PATIENT: CPT | Performed by: OPTOMETRIST

## 2022-08-15 ASSESSMENT — REFRACTION_AUTOREFRACTION
OD_AXIS: 096
OD_CYLINDER: -1.00
OS_CYLINDER: -2.00
OD_SPHERE: +2.00
OS_AXIS: 087
OS_SPHERE: +2.25

## 2022-08-15 ASSESSMENT — CONFRONTATIONAL VISUAL FIELD TEST (CVF)
OS_FINDINGS: FULL
OD_FINDINGS: FULL

## 2022-08-15 ASSESSMENT — KERATOMETRY
OS_K2POWER_DIOPTERS: 45.50
OD_K1POWER_DIOPTERS: 44.00
OS_AXISANGLE_DEGREES: 172
OD_AXISANGLE_DEGREES: 027
OD_K2POWER_DIOPTERS: 45.25
OS_K1POWER_DIOPTERS: 43.75

## 2022-08-15 ASSESSMENT — LID EXAM ASSESSMENTS
OS_BLEPHARITIS: LLL LUL T
OD_BLEPHARITIS: RLL RUL T

## 2022-08-15 ASSESSMENT — TONOMETRY
OS_IOP_MMHG: 13
OD_IOP_MMHG: 14

## 2022-08-15 ASSESSMENT — AXIALLENGTH_DERIVED
OS_AL: 22.7248
OD_AL: 22.6348

## 2022-08-15 ASSESSMENT — SPHEQUIV_DERIVED
OD_SPHEQUIV: 1.5
OS_SPHEQUIV: 1.25

## 2022-08-15 ASSESSMENT — VISUAL ACUITY
OD_BCVA: 20/25-
OS_BCVA: 20/30

## 2022-10-12 ENCOUNTER — OFFICE (OUTPATIENT)
Dept: URBAN - METROPOLITAN AREA CLINIC 88 | Facility: CLINIC | Age: 77
Setting detail: OPHTHALMOLOGY
End: 2022-10-12
Payer: MEDICARE

## 2022-10-12 DIAGNOSIS — H40.033: ICD-10-CM

## 2022-10-12 DIAGNOSIS — H01.004: ICD-10-CM

## 2022-10-12 DIAGNOSIS — H00.14: ICD-10-CM

## 2022-10-12 DIAGNOSIS — H01.001: ICD-10-CM

## 2022-10-12 DIAGNOSIS — H25.13: ICD-10-CM

## 2022-10-12 PROCEDURE — 76514 ECHO EXAM OF EYE THICKNESS: CPT | Performed by: OPHTHALMOLOGY

## 2022-10-12 PROCEDURE — 99213 OFFICE O/P EST LOW 20 MIN: CPT | Performed by: OPHTHALMOLOGY

## 2022-10-12 PROCEDURE — 92083 EXTENDED VISUAL FIELD XM: CPT | Performed by: OPHTHALMOLOGY

## 2022-10-12 PROCEDURE — 92020 GONIOSCOPY: CPT | Performed by: OPHTHALMOLOGY

## 2022-10-12 PROCEDURE — 92133 CPTRZD OPH DX IMG PST SGM ON: CPT | Performed by: OPHTHALMOLOGY

## 2022-10-12 ASSESSMENT — KERATOMETRY
OS_AXISANGLE_DEGREES: 172
OD_K2POWER_DIOPTERS: 45.00
OD_AXISANGLE_DEGREES: 030
OD_K1POWER_DIOPTERS: 44.00
OS_K1POWER_DIOPTERS: 43.75
OS_K2POWER_DIOPTERS: 45.50

## 2022-10-12 ASSESSMENT — VISUAL ACUITY
OD_BCVA: 20/30
OS_BCVA: 20/40-2

## 2022-10-12 ASSESSMENT — REFRACTION_AUTOREFRACTION
OD_AXIS: 104
OD_CYLINDER: -1.00
OS_CYLINDER: -2.50
OS_AXIS: 089
OD_SPHERE: +2.25
OS_SPHERE: +2.75

## 2022-10-12 ASSESSMENT — PACHYMETRY
OD_CT_UM: 537
OS_CT_UM: 562
OD_CT_CORRECTION: 1
OS_CT_CORRECTION: -1

## 2022-10-12 ASSESSMENT — LID EXAM ASSESSMENTS
OS_BLEPHARITIS: LLL LUL T
OD_BLEPHARITIS: RLL RUL T

## 2022-10-12 ASSESSMENT — AXIALLENGTH_DERIVED
OS_AL: 22.6348
OD_AL: 22.5875

## 2022-10-12 ASSESSMENT — TONOMETRY
OD_IOP_MMHG: 11
OS_IOP_MMHG: 11

## 2022-10-12 ASSESSMENT — CONFRONTATIONAL VISUAL FIELD TEST (CVF)
OD_FINDINGS: FULL
OS_FINDINGS: FULL

## 2022-10-12 ASSESSMENT — SPHEQUIV_DERIVED
OS_SPHEQUIV: 1.5
OD_SPHEQUIV: 1.75

## 2022-10-20 ENCOUNTER — RX ONLY (RX ONLY)
Age: 77
End: 2022-10-20

## 2022-10-20 ENCOUNTER — OFFICE (OUTPATIENT)
Dept: URBAN - METROPOLITAN AREA CLINIC 12 | Facility: CLINIC | Age: 77
Setting detail: OPHTHALMOLOGY
End: 2022-10-20
Payer: MEDICARE

## 2022-10-20 DIAGNOSIS — H40.031: ICD-10-CM

## 2022-10-20 PROCEDURE — 66761 REVISION OF IRIS: CPT | Performed by: OPHTHALMOLOGY

## 2022-10-20 ASSESSMENT — REFRACTION_AUTOREFRACTION
OD_AXIS: 101
OS_CYLINDER: -2.00
OD_CYLINDER: -0.75
OS_SPHERE: +2.00
OD_SPHERE: +1.75
OS_AXIS: 088

## 2022-10-20 ASSESSMENT — PACHYMETRY
OD_CT_CORRECTION: 1
OS_CT_CORRECTION: -1
OD_CT_UM: 537
OS_CT_UM: 562

## 2022-10-20 ASSESSMENT — AXIALLENGTH_DERIVED
OD_AL: 22.7222
OS_AL: 22.8155

## 2022-10-20 ASSESSMENT — KERATOMETRY
OD_K1POWER_DIOPTERS: 44.00
OS_K2POWER_DIOPTERS: 45.50
OD_AXISANGLE_DEGREES: 031
OD_K2POWER_DIOPTERS: 45.00
OS_AXISANGLE_DEGREES: 170
OS_K1POWER_DIOPTERS: 43.75

## 2022-10-20 ASSESSMENT — SPHEQUIV_DERIVED
OS_SPHEQUIV: 1
OD_SPHEQUIV: 1.375

## 2022-10-20 ASSESSMENT — TONOMETRY
OS_IOP_MMHG: 19
OD_IOP_MMHG: 14

## 2022-10-20 ASSESSMENT — REFRACTION_CURRENTRX
OS_OVR_VA: 20/
OS_VPRISM_DIRECTION: SV
OD_CYLINDER: -1.00
OD_OVR_VA: 20/
OS_CYLINDER: -1.75
OS_SPHERE: +1.75
OD_VPRISM_DIRECTION: SV
OS_AXIS: 079
OD_AXIS: 063
OD_SPHERE: +1.50

## 2022-10-20 ASSESSMENT — VISUAL ACUITY
OD_BCVA: 20/40+2
OS_BCVA: 20/30+2

## 2022-10-20 ASSESSMENT — CONFRONTATIONAL VISUAL FIELD TEST (CVF)
OS_FINDINGS: FULL
OD_FINDINGS: FULL

## 2022-10-27 ENCOUNTER — RX ONLY (RX ONLY)
Age: 77
End: 2022-10-27

## 2022-10-27 ENCOUNTER — OFFICE (OUTPATIENT)
Dept: URBAN - METROPOLITAN AREA CLINIC 12 | Facility: CLINIC | Age: 77
Setting detail: OPHTHALMOLOGY
End: 2022-10-27
Payer: MEDICARE

## 2022-10-27 DIAGNOSIS — H40.032: ICD-10-CM

## 2022-10-27 PROBLEM — H25.13 CATARACT SENILE NUCLEAR SCLEROSIS; BOTH EYES: Status: ACTIVE | Noted: 2022-10-12

## 2022-10-27 PROBLEM — H01.001 BLEPHARITIS; RIGHT UPPER LID, LEFT UPPER LID: Status: ACTIVE | Noted: 2022-10-12

## 2022-10-27 PROBLEM — H40.033 NARROW ANGLE GLAUCOMA SUSPECT; BOTH EYES: Status: ACTIVE | Noted: 2022-08-15

## 2022-10-27 PROBLEM — H00.14 CHALAZION; LEFT UPPER LID: Status: ACTIVE | Noted: 2022-08-15

## 2022-10-27 PROBLEM — H01.004 BLEPHARITIS; RIGHT UPPER LID, LEFT UPPER LID: Status: ACTIVE | Noted: 2022-10-12

## 2022-10-27 PROCEDURE — 66761 REVISION OF IRIS: CPT | Performed by: OPHTHALMOLOGY

## 2022-10-27 ASSESSMENT — KERATOMETRY
OD_AXISANGLE_DEGREES: 033
OS_K1POWER_DIOPTERS: 43.75
OD_K1POWER_DIOPTERS: 44.00
OS_AXISANGLE_DEGREES: 171
OD_K2POWER_DIOPTERS: 45.25
OS_K2POWER_DIOPTERS: 45.50

## 2022-10-27 ASSESSMENT — PACHYMETRY
OD_CT_CORRECTION: 1
OS_CT_UM: 562
OS_CT_CORRECTION: -1
OD_CT_UM: 537

## 2022-10-27 ASSESSMENT — TONOMETRY
OD_IOP_MMHG: 12
OS_IOP_MMHG: 14

## 2022-10-27 ASSESSMENT — SPHEQUIV_DERIVED
OD_SPHEQUIV: 1.25
OS_SPHEQUIV: 1.25

## 2022-10-27 ASSESSMENT — REFRACTION_AUTOREFRACTION
OD_SPHERE: +1.75
OS_AXIS: 087
OS_CYLINDER: -2.00
OS_SPHERE: +2.25
OD_AXIS: 109
OD_CYLINDER: -1.00

## 2022-10-27 ASSESSMENT — REFRACTION_CURRENTRX
OS_AXIS: 079
OS_CYLINDER: -1.75
OD_AXIS: 063
OS_VPRISM_DIRECTION: SV
OS_SPHERE: +1.75
OD_CYLINDER: -1.00
OD_SPHERE: +1.50
OD_VPRISM_DIRECTION: SV
OS_OVR_VA: 20/
OD_OVR_VA: 20/

## 2022-10-27 ASSESSMENT — AXIALLENGTH_DERIVED
OS_AL: 22.7248
OD_AL: 22.7248

## 2022-10-27 ASSESSMENT — CONFRONTATIONAL VISUAL FIELD TEST (CVF)
OS_FINDINGS: FULL
OD_FINDINGS: FULL

## 2022-10-27 ASSESSMENT — VISUAL ACUITY
OS_BCVA: 20/40
OD_BCVA: 20/25-3

## 2023-09-09 ENCOUNTER — OFFICE (OUTPATIENT)
Dept: URBAN - METROPOLITAN AREA CLINIC 100 | Facility: CLINIC | Age: 78
Setting detail: OPHTHALMOLOGY
End: 2023-09-09
Payer: MEDICARE

## 2023-09-09 DIAGNOSIS — H01.001: ICD-10-CM

## 2023-09-09 DIAGNOSIS — H25.13: ICD-10-CM

## 2023-09-09 DIAGNOSIS — H01.004: ICD-10-CM

## 2023-09-09 DIAGNOSIS — H40.033: ICD-10-CM

## 2023-09-09 PROCEDURE — 92014 COMPRE OPH EXAM EST PT 1/>: CPT | Performed by: OPHTHALMOLOGY

## 2023-09-09 PROCEDURE — 92250 FUNDUS PHOTOGRAPHY W/I&R: CPT | Performed by: OPHTHALMOLOGY

## 2023-09-09 ASSESSMENT — REFRACTION_AUTOREFRACTION
OS_SPHERE: +2.50
OD_AXIS: 093
OD_SPHERE: +2.75
OS_CYLINDER: -2.00
OS_AXIS: 090
OD_CYLINDER: -1.50

## 2023-09-09 ASSESSMENT — REFRACTION_CURRENTRX
OD_OVR_VA: 20/
OD_VPRISM_DIRECTION: SV
OS_SPHERE: +1.75
OD_AXIS: 052
OD_CYLINDER: -1.00
OD_SPHERE: +1.50
OS_OVR_VA: 20/
OS_VPRISM_DIRECTION: SV
OS_AXIS: 075
OS_CYLINDER: -1.75

## 2023-09-09 ASSESSMENT — KERATOMETRY
OS_AXISANGLE_DEGREES: 174
OD_K2POWER_DIOPTERS: 45.00
OS_K2POWER_DIOPTERS: 45.50
OS_K1POWER_DIOPTERS: 43.50
OD_AXISANGLE_DEGREES: 021
OD_K1POWER_DIOPTERS: 43.75

## 2023-09-09 ASSESSMENT — LID EXAM ASSESSMENTS
OD_BLEPHARITIS: RLL RUL T
OS_BLEPHARITIS: LLL LUL T

## 2023-09-09 ASSESSMENT — TONOMETRY
OS_IOP_MMHG: 16
OD_IOP_MMHG: 16

## 2023-09-09 ASSESSMENT — CONFRONTATIONAL VISUAL FIELD TEST (CVF)
OD_FINDINGS: FULL
OS_FINDINGS: FULL

## 2023-09-09 ASSESSMENT — PACHYMETRY
OS_CT_CORRECTION: -1
OS_CT_UM: 562
OD_CT_UM: 537
OD_CT_CORRECTION: 1

## 2023-09-09 ASSESSMENT — SPHEQUIV_DERIVED
OD_SPHEQUIV: 2
OS_SPHEQUIV: 1.5

## 2023-09-09 ASSESSMENT — VISUAL ACUITY
OD_BCVA: 20/25-2
OS_BCVA: 20/30

## 2023-09-09 ASSESSMENT — AXIALLENGTH_DERIVED
OS_AL: 22.6771
OD_AL: 22.5404

## 2023-09-25 NOTE — PRE-OP CHECKLIST - TAMPON REMOVED
Patient in clinic for IV Venofer. Vitals stable, and treatment tolerated well. Patient and mother declined to stay for the 30 minute monitoring post infusion. Will monitor patient and call clinic or report to ER if any concerns. Patient discharged from clinic to home in stable condition.     n/a

## 2025-05-19 ENCOUNTER — OFFICE (OUTPATIENT)
Dept: URBAN - METROPOLITAN AREA CLINIC 100 | Facility: CLINIC | Age: 80
Setting detail: OPHTHALMOLOGY
End: 2025-05-19
Payer: MEDICARE

## 2025-05-19 DIAGNOSIS — H52.4: ICD-10-CM

## 2025-05-19 DIAGNOSIS — H25.13: ICD-10-CM

## 2025-05-19 DIAGNOSIS — H01.004: ICD-10-CM

## 2025-05-19 DIAGNOSIS — E11.9: ICD-10-CM

## 2025-05-19 DIAGNOSIS — H40.033: ICD-10-CM

## 2025-05-19 DIAGNOSIS — H01.001: ICD-10-CM

## 2025-05-19 PROCEDURE — 92015 DETERMINE REFRACTIVE STATE: CPT | Performed by: OPHTHALMOLOGY

## 2025-05-19 PROCEDURE — 99213 OFFICE O/P EST LOW 20 MIN: CPT | Performed by: OPHTHALMOLOGY

## 2025-05-19 ASSESSMENT — REFRACTION_CURRENTRX
OS_CYLINDER: -0.50
OS_CYLINDER: -1.75
OD_AXIS: 054
OS_OVR_VA: 20/
OD_SPHERE: +1.50
OD_VPRISM_DIRECTION: SV
OS_OVR_VA: 20/
OS_VPRISM_DIRECTION: SV
OD_VPRISM_DIRECTION: SV
OS_VPRISM_DIRECTION: SV
OS_SPHERE: +1.75
OD_CYLINDER: -1.00
OS_AXIS: 074
OD_CYLINDER: -1.00
OS_OVR_VA: 20/
OD_SPHERE: +1.50
OD_VPRISM_DIRECTION: SV
OD_OVR_VA: 20/
OS_CYLINDER: -1.75
OS_AXIS: 075
OD_AXIS: 052
OD_CYLINDER: -0.25
OS_SPHERE: +1.75
OD_OVR_VA: 20/
OD_SPHERE: +3.50
OS_VPRISM_DIRECTION: SV
OS_SPHERE: +3.75
OD_AXIS: 165
OS_AXIS: 068
OD_OVR_VA: 20/

## 2025-05-19 ASSESSMENT — KERATOMETRY
OD_AXISANGLE_DEGREES: 019
OS_K2POWER_DIOPTERS: 45.50
OD_K2POWER_DIOPTERS: 45.00
METHOD_AUTO_MANUAL: AUTO
OD_K1POWER_DIOPTERS: 44.00
OS_AXISANGLE_DEGREES: 178
OS_K1POWER_DIOPTERS: 43.50

## 2025-05-19 ASSESSMENT — LID EXAM ASSESSMENTS
OD_BLEPHARITIS: RLL RUL T
OS_BLEPHARITIS: LLL LUL T

## 2025-05-19 ASSESSMENT — TONOMETRY
OD_IOP_MMHG: 16
OS_IOP_MMHG: 16

## 2025-05-19 ASSESSMENT — REFRACTION_AUTOREFRACTION
OS_SPHERE: +2.75
OS_CYLINDER: -2.50
OD_CYLINDER: -2.00
OD_AXIS: 077
OS_AXIS: 094
OD_SPHERE: +3.00

## 2025-05-19 ASSESSMENT — REFRACTION_MANIFEST
OS_VA1: 20/30-2
OS_AXIS: 095
OD_AXIS: 075
OS_CYLINDER: -2.50
OS_VA1: 20/30-2
OD_CYLINDER: -2.00
OS_SPHERE: +2.75
OS_AXIS: 095
OS_SPHERE: +2.75
OD_CYLINDER: -2.00
OD_VA1: 20/40
OD_SPHERE: +3.00
OD_SPHERE: +3.00
OD_ADD: +2.50
OD_VA1: 20/40
OD_AXIS: 075
OS_ADD: +2.50
OS_CYLINDER: -2.50

## 2025-05-19 ASSESSMENT — PACHYMETRY
OD_CT_CORRECTION: 1
OD_CT_UM: 537
OS_CT_CORRECTION: -1
OS_CT_UM: 562

## 2025-05-19 ASSESSMENT — CONFRONTATIONAL VISUAL FIELD TEST (CVF)
OD_FINDINGS: FULL
OS_FINDINGS: FULL

## 2025-05-19 ASSESSMENT — VISUAL ACUITY
OD_BCVA: 20/30-2
OS_BCVA: 20/40

## 2025-05-19 NOTE — ED ADULT TRIAGE NOTE - BP NONINVASIVE DIASTOLIC (MM HG)
"Physical Therapy Treatment    Patient Name: Taylor Reinoso  MRN: [id number]  Today's Date: 5/19/2025  Time Calculation  Start Time: 1530  Stop Time: 1610  Time Calculation (min): 40 min  PT Therapeutic Procedures Time Entry  Manual Therapy Time Entry: 15  Therapeutic Exercise Time Entry: 23         Current Problem  Problem List Items Addressed This Visit    None  Visit Diagnoses         Codes      Lumbar radiculopathy     M54.16            Subjective   Pt.'s name and birthday confirmed.  Pt. Was sorer after stretches last session, 10/10 pain.  States she had a hard time sleeping.  Pt. Reports 4-5/10  pain right now.  Pain is across the right lower back and radiates towards the right groin area.  No c/o recent falls.    Reason for Referral: Back pain  Referred By: JACOBO Pennington-CNP  General Comment: visit 2      Precautions  Precautions  Precautions Comment: Avoid peripheralization of symptoms, avoid forced/excessive flexion      Pain  Pain Assessment: 0-10  0-10 (Numeric) Pain Score: 5 - Moderate pain  Pain Type: Acute pain  Pain Location: Back  Pain Orientation: Right  Pain Radiating Towards: RLE  Pain Descriptors: Aching, Burning  Pain Frequency: Constant/continuous  Patient's Stated Pain Goal: No pain    Objective:  Palpable tenderness noted with right lower back.    Treatments: 23 mins        Checked pelvic alignment/MET: r ant rotation  LTR (very small range) x5 each X painful  BKFO x10 each (very small range on R)   Open book x5 (felt good first rep, then more painful)  Wall spinal ext. X5 -affects right shoulder  Scap. Retraction x10  Supine piriformis stretches x3  20\" hold P           Manual  15 mins  Manual traction x60\" (X)  Piriformis stretch x30\" on R (X)  Glute med. STM x3' (X)  STM to right QL, lumbar paraspinals, glute/hip (N)          OP EDUCATION:  Access Code: [email or web address]  URL: [email or web address]  Date: 05/06/2025  Prepared by: Mikael Tomlinson     Exercises  - Supine Lower " "Trunk Rotation  - 2 x daily - 7 x weekly - 2 sets - 10 reps - 5\" hold  - Bent Knee Fallouts  - 2 x daily - 7 x weekly - 2 sets - 10 reps  - Standing Lumbar Extension at Wall  - 2 x daily - 7 x weekly - 1 sets - 10 reps  - Sidelying Open Book  - 2 x daily - 7 x weekly - 1 sets - 10 reps - 5\" hold  - Seated Scapular Retraction  - 2 x daily - 7 x weekly - 1 sets - 10 reps  - Seated Thoracic Lumbar Extension  - 2 x daily - 7 x weekly - 1 sets - 10 reps - 5\" hold                 Assessment:  Checked pelvic alignment with right anterior rotation noted.  Able to correct which patient  was more symmetrical.  Reviewed stretches with patient.  LTR was painful this date.  STM applied to affected areas, palpable tenderness noted.  Pt. Reports decreased pain after session, 4/10.  Pt. States she feels like she's walking straighter at end of session.         Plan:  Continue with POC and progress per tolerance to improve work duties with greater ease.  MB         OP PT Plan  Treatment/Interventions: Aquatic therapy, Cryotherapy, Dry needling, Education/ Instruction, Electrical stimulation, Gait training, Hot pack, Manual therapy, Mechanical traction, Neuromuscular re-education, Taping techniques, Therapeutic activities, Therapeutic exercises  PT Plan: Skilled PT  PT Frequency: 2 times per week (1-2x/week)  Duration: 6 weeks  Certification Period Start Date: 05/06/25  Rehab Potential: Fair  Plan of Care Agreement: Patient              Goals:  Active       PT Problem       PT Goal 1       Start:  05/06/25    Expected End:  08/04/25       Pt will be compliant c HEP for continuity of POC, in 2 weeks.           PT Goal 2       Start:  05/06/25    Expected End:  08/04/25       Pt will have <=2/10 pain for one week, for improved QOL, in 4 weeks.           PT Goal 3       Start:  05/06/25    Expected End:  08/04/25       Pt will increase BLE and trunk strength to 4+/5 or greater for improved functional mobility and decreased risk of falls, " in 6 weeks.           PT Goal 4       Start:  05/06/25    Expected End:  08/04/25       Pt will score 20% (MCID x2) on Revised Oswestry, indicating improved pain and functional ability, in 6 weeks.           Patient Stated Goal 1       Start:  05/06/25    Expected End:  08/04/25       Patient states that their goal is to be able to sleep, participate in hobbies, and work without significant pain with physical therapy intervention.              67

## 2025-06-30 ENCOUNTER — OFFICE (OUTPATIENT)
Dept: URBAN - METROPOLITAN AREA CLINIC 100 | Facility: CLINIC | Age: 80
Setting detail: OPHTHALMOLOGY
End: 2025-06-30
Payer: MEDICARE

## 2025-06-30 DIAGNOSIS — E11.9: ICD-10-CM

## 2025-06-30 DIAGNOSIS — H25.013: ICD-10-CM

## 2025-06-30 DIAGNOSIS — H40.033: ICD-10-CM

## 2025-06-30 DIAGNOSIS — H01.004: ICD-10-CM

## 2025-06-30 DIAGNOSIS — H01.001: ICD-10-CM

## 2025-06-30 DIAGNOSIS — H25.13: ICD-10-CM

## 2025-06-30 PROCEDURE — 92014 COMPRE OPH EXAM EST PT 1/>: CPT | Performed by: OPHTHALMOLOGY

## 2025-06-30 ASSESSMENT — REFRACTION_MANIFEST
OD_AXIS: 075
OS_CYLINDER: -2.50
OD_CYLINDER: -2.00
OD_CYLINDER: -2.00
OS_VA1: 20/30-2
OS_CYLINDER: -2.50
OS_ADD: +2.50
OS_SPHERE: +2.75
OD_SPHERE: +3.00
OD_VA1: 20/40
OS_AXIS: 095
OD_VA1: 20/40
OS_AXIS: 095
OD_SPHERE: +3.00
OD_ADD: +2.50
OS_SPHERE: +2.75
OS_VA1: 20/30-2
OD_AXIS: 075

## 2025-06-30 ASSESSMENT — REFRACTION_CURRENTRX
OS_SPHERE: +3.75
OD_SPHERE: +3.50
OD_VPRISM_DIRECTION: SV
OS_AXIS: 075
OS_OVR_VA: 20/
OD_OVR_VA: 20/
OS_CYLINDER: -1.75
OS_SPHERE: +1.75
OD_AXIS: 052
OD_CYLINDER: -0.25
OD_SPHERE: +1.50
OD_OVR_VA: 20/
OS_VPRISM_DIRECTION: SV
OS_CYLINDER: -1.75
OS_VPRISM_DIRECTION: SV
OS_AXIS: 068
OD_VPRISM_DIRECTION: SV
OD_AXIS: 054
OS_SPHERE: +1.75
OS_CYLINDER: -0.50
OS_OVR_VA: 20/
OD_AXIS: 165
OS_OVR_VA: 20/
OD_CYLINDER: -1.00
OS_AXIS: 074
OD_VPRISM_DIRECTION: SV
OD_SPHERE: +1.50
OD_CYLINDER: -1.00
OD_OVR_VA: 20/
OS_VPRISM_DIRECTION: SV

## 2025-06-30 ASSESSMENT — VISUAL ACUITY
OD_BCVA: 20/40-
OS_BCVA: 20/50

## 2025-06-30 ASSESSMENT — PACHYMETRY
OS_CT_CORRECTION: -1
OS_CT_UM: 562
OD_CT_UM: 537
OD_CT_CORRECTION: 1

## 2025-06-30 ASSESSMENT — CONFRONTATIONAL VISUAL FIELD TEST (CVF)
OS_FINDINGS: FULL
OD_FINDINGS: FULL

## 2025-06-30 ASSESSMENT — TONOMETRY
OS_IOP_MMHG: 14
OD_IOP_MMHG: 15